# Patient Record
Sex: FEMALE | Race: WHITE | Employment: FULL TIME | URBAN - METROPOLITAN AREA
[De-identification: names, ages, dates, MRNs, and addresses within clinical notes are randomized per-mention and may not be internally consistent; named-entity substitution may affect disease eponyms.]

---

## 2017-03-13 ENCOUNTER — HOSPITAL ENCOUNTER (OUTPATIENT)
Dept: GENERAL RADIOLOGY | Age: 42
Discharge: HOME | End: 2017-03-13
Attending: INTERNAL MEDICINE | Admitting: INTERNAL MEDICINE

## 2017-03-13 DIAGNOSIS — E03.9 HYPOTHYROIDISM, UNSPECIFIED TYPE: ICD-10-CM

## 2017-03-13 LAB
T3 FREE: 2.9 PG/ML (ref 2.3–4.2)
T4 FREE: 0.8 NG/DL (ref 0.9–1.8)
TSH SERPL DL<=0.05 MIU/L-ACNC: 2.2 UIU/ML (ref 0.27–4.2)

## 2017-03-15 ENCOUNTER — OFFICE VISIT (OUTPATIENT)
Dept: ENDOCRINOLOGY | Age: 42
End: 2017-03-15

## 2017-03-15 VITALS
TEMPERATURE: 98.5 F | DIASTOLIC BLOOD PRESSURE: 77 MMHG | HEIGHT: 62 IN | BODY MASS INDEX: 34.23 KG/M2 | OXYGEN SATURATION: 98 % | WEIGHT: 186 LBS | HEART RATE: 71 BPM | SYSTOLIC BLOOD PRESSURE: 129 MMHG

## 2017-03-15 DIAGNOSIS — E03.9 HYPOTHYROIDISM, UNSPECIFIED TYPE: Primary | ICD-10-CM

## 2017-03-15 PROCEDURE — 99213 OFFICE O/P EST LOW 20 MIN: CPT | Performed by: INTERNAL MEDICINE

## 2017-05-01 RX ORDER — LEVOTHYROXINE, LIOTHYRONINE 19; 4.5 UG/1; UG/1
TABLET ORAL
Qty: 30 TABLET | Refills: 4 | Status: SHIPPED | OUTPATIENT
Start: 2017-05-01 | End: 2017-09-20 | Stop reason: SDUPTHER

## 2017-09-18 ENCOUNTER — HOSPITAL ENCOUNTER (OUTPATIENT)
Dept: GENERAL RADIOLOGY | Age: 42
Discharge: HOME | End: 2017-09-18
Attending: INTERNAL MEDICINE | Admitting: INTERNAL MEDICINE

## 2017-09-18 DIAGNOSIS — E03.9 HYPOTHYROIDISM, UNSPECIFIED TYPE: ICD-10-CM

## 2017-09-18 LAB
ANION GAP SERPL CALCULATED.3IONS-SCNC: 14 MMOL/L (ref 3–16)
BUN BLDV-MCNC: 11 MG/DL (ref 7–20)
CALCIUM SERPL-MCNC: 9.4 MG/DL (ref 8.3–10.6)
CHLORIDE BLD-SCNC: 99 MMOL/L (ref 99–110)
CO2: 25 MMOL/L (ref 21–32)
CREAT SERPL-MCNC: 0.8 MG/DL (ref 0.6–1.1)
GFR AFRICAN AMERICAN: >60
GFR NON-AFRICAN AMERICAN: >60
GLUCOSE BLD-MCNC: 83 MG/DL (ref 70–99)
POTASSIUM SERPL-SCNC: 4.2 MMOL/L (ref 3.5–5.1)
SODIUM BLD-SCNC: 138 MMOL/L (ref 136–145)
T3 FREE: 3 PG/ML (ref 2.3–4.2)
T4 FREE: 0.8 NG/DL (ref 0.9–1.8)
TSH SERPL DL<=0.05 MIU/L-ACNC: 2.62 UIU/ML (ref 0.27–4.2)

## 2017-09-20 ENCOUNTER — OFFICE VISIT (OUTPATIENT)
Dept: ENDOCRINOLOGY | Age: 42
End: 2017-09-20

## 2017-09-20 VITALS
OXYGEN SATURATION: 96 % | HEART RATE: 67 BPM | SYSTOLIC BLOOD PRESSURE: 120 MMHG | HEIGHT: 62 IN | WEIGHT: 187.8 LBS | DIASTOLIC BLOOD PRESSURE: 81 MMHG | RESPIRATION RATE: 12 BRPM | BODY MASS INDEX: 34.56 KG/M2

## 2017-09-20 DIAGNOSIS — E03.9 ACQUIRED HYPOTHYROIDISM: Primary | ICD-10-CM

## 2017-09-20 PROCEDURE — 99213 OFFICE O/P EST LOW 20 MIN: CPT | Performed by: INTERNAL MEDICINE

## 2017-09-20 RX ORDER — LEVOTHYROXINE AND LIOTHYRONINE 19; 4.5 UG/1; UG/1
TABLET ORAL
Qty: 30 TABLET | Refills: 5 | Status: SHIPPED | OUTPATIENT
Start: 2017-09-20 | End: 2018-04-04 | Stop reason: SDUPTHER

## 2017-11-15 ENCOUNTER — HOSPITAL ENCOUNTER (OUTPATIENT)
Dept: MAMMOGRAPHY | Age: 42
Discharge: HOME | End: 2017-11-15
Attending: OBSTETRICS & GYNECOLOGY | Admitting: OBSTETRICS & GYNECOLOGY

## 2017-11-15 DIAGNOSIS — N63.0 LUMP OR MASS IN BREAST: ICD-10-CM

## 2018-03-19 ENCOUNTER — HOSPITAL ENCOUNTER (OUTPATIENT)
Dept: GENERAL RADIOLOGY | Age: 43
Discharge: HOME | End: 2018-03-19
Attending: INTERNAL MEDICINE | Admitting: INTERNAL MEDICINE

## 2018-03-19 LAB
ANION GAP SERPL CALCULATED.3IONS-SCNC: 13 MMOL/L (ref 3–16)
BUN BLDV-MCNC: 11 MG/DL (ref 7–20)
CALCIUM SERPL-MCNC: 8.8 MG/DL (ref 8.3–10.6)
CHLORIDE BLD-SCNC: 102 MMOL/L (ref 99–110)
CO2: 26 MMOL/L (ref 21–32)
CREAT SERPL-MCNC: 0.7 MG/DL (ref 0.6–1.1)
GFR AFRICAN AMERICAN: >60
GFR NON-AFRICAN AMERICAN: >60
GLUCOSE BLD-MCNC: 83 MG/DL (ref 70–99)
POTASSIUM SERPL-SCNC: 4.1 MMOL/L (ref 3.5–5.1)
SODIUM BLD-SCNC: 141 MMOL/L (ref 136–145)
T3 FREE: 3.2 PG/ML (ref 2.3–4.2)
T4 FREE: 0.9 NG/DL (ref 0.9–1.8)
TSH SERPL DL<=0.05 MIU/L-ACNC: 2.45 UIU/ML (ref 0.27–4.2)

## 2018-03-21 ENCOUNTER — OFFICE VISIT (OUTPATIENT)
Dept: ENDOCRINOLOGY | Age: 43
End: 2018-03-21

## 2018-03-21 VITALS
BODY MASS INDEX: 35.55 KG/M2 | OXYGEN SATURATION: 99 % | SYSTOLIC BLOOD PRESSURE: 121 MMHG | HEIGHT: 62 IN | HEART RATE: 84 BPM | DIASTOLIC BLOOD PRESSURE: 86 MMHG | WEIGHT: 193.2 LBS

## 2018-03-21 DIAGNOSIS — R73.09 ELEVATED RANDOM BLOOD GLUCOSE LEVEL: ICD-10-CM

## 2018-03-21 DIAGNOSIS — R53.82 CHRONIC FATIGUE: ICD-10-CM

## 2018-03-21 DIAGNOSIS — E03.9 ACQUIRED HYPOTHYROIDISM: Primary | ICD-10-CM

## 2018-03-21 DIAGNOSIS — G47.33 OBSTRUCTIVE SLEEP APNEA SYNDROME: ICD-10-CM

## 2018-03-21 PROBLEM — E66.9 CLASS 2 OBESITY IN ADULT: Status: ACTIVE | Noted: 2018-03-21

## 2018-03-21 PROBLEM — R73.9 ELEVATED RANDOM BLOOD GLUCOSE LEVEL: Status: ACTIVE | Noted: 2018-03-21

## 2018-03-21 PROBLEM — E66.812 CLASS 2 OBESITY IN ADULT: Status: ACTIVE | Noted: 2018-03-21

## 2018-03-21 LAB — HBA1C MFR BLD: 5.4 %

## 2018-03-21 PROCEDURE — G8484 FLU IMMUNIZE NO ADMIN: HCPCS | Performed by: NURSE PRACTITIONER

## 2018-03-21 PROCEDURE — 83036 HEMOGLOBIN GLYCOSYLATED A1C: CPT | Performed by: NURSE PRACTITIONER

## 2018-03-21 PROCEDURE — G8427 DOCREV CUR MEDS BY ELIG CLIN: HCPCS | Performed by: NURSE PRACTITIONER

## 2018-03-21 PROCEDURE — G8417 CALC BMI ABV UP PARAM F/U: HCPCS | Performed by: NURSE PRACTITIONER

## 2018-03-21 PROCEDURE — 99214 OFFICE O/P EST MOD 30 MIN: CPT | Performed by: NURSE PRACTITIONER

## 2018-03-21 PROCEDURE — 1036F TOBACCO NON-USER: CPT | Performed by: NURSE PRACTITIONER

## 2018-03-21 RX ORDER — PHENTERMINE HYDROCHLORIDE 37.5 MG/1
37.5 CAPSULE ORAL EVERY MORNING
Qty: 30 CAPSULE | Refills: 0 | Status: SHIPPED | OUTPATIENT
Start: 2018-03-21 | End: 2018-04-18

## 2018-03-21 ASSESSMENT — ENCOUNTER SYMPTOMS
EYE PAIN: 0
SHORTNESS OF BREATH: 0
DIARRHEA: 0
BACK PAIN: 0
CONSTIPATION: 0
ABDOMINAL PAIN: 0
COLOR CHANGE: 0

## 2018-03-21 ASSESSMENT — PATIENT HEALTH QUESTIONNAIRE - PHQ9
SUM OF ALL RESPONSES TO PHQ9 QUESTIONS 1 & 2: 0
2. FEELING DOWN, DEPRESSED OR HOPELESS: 0
SUM OF ALL RESPONSES TO PHQ QUESTIONS 1-9: 0
1. LITTLE INTEREST OR PLEASURE IN DOING THINGS: 0

## 2018-03-21 NOTE — ASSESSMENT & PLAN NOTE
Will schedule a new sleep study after completing Phentermine protocol and if snoring and fatigue do not subside with the anticipated weight loss. Declines a referral at this time.

## 2018-04-04 RX ORDER — LEVOTHYROXINE, LIOTHYRONINE 19; 4.5 UG/1; UG/1
TABLET ORAL
Qty: 30 TABLET | Refills: 5 | Status: SHIPPED | OUTPATIENT
Start: 2018-04-04 | End: 2018-10-09 | Stop reason: SDUPTHER

## 2018-04-18 ENCOUNTER — OFFICE VISIT (OUTPATIENT)
Dept: ENDOCRINOLOGY | Age: 43
End: 2018-04-18

## 2018-04-18 VITALS
WEIGHT: 191 LBS | DIASTOLIC BLOOD PRESSURE: 80 MMHG | SYSTOLIC BLOOD PRESSURE: 122 MMHG | HEIGHT: 62 IN | BODY MASS INDEX: 35.15 KG/M2

## 2018-04-18 DIAGNOSIS — E03.9 ACQUIRED HYPOTHYROIDISM: Primary | ICD-10-CM

## 2018-04-18 DIAGNOSIS — R53.82 CHRONIC FATIGUE: ICD-10-CM

## 2018-04-18 DIAGNOSIS — G47.33 OBSTRUCTIVE SLEEP APNEA SYNDROME: ICD-10-CM

## 2018-04-18 PROCEDURE — 99214 OFFICE O/P EST MOD 30 MIN: CPT | Performed by: NURSE PRACTITIONER

## 2018-04-18 PROCEDURE — 1036F TOBACCO NON-USER: CPT | Performed by: NURSE PRACTITIONER

## 2018-04-18 PROCEDURE — G8417 CALC BMI ABV UP PARAM F/U: HCPCS | Performed by: NURSE PRACTITIONER

## 2018-04-18 PROCEDURE — G8427 DOCREV CUR MEDS BY ELIG CLIN: HCPCS | Performed by: NURSE PRACTITIONER

## 2018-04-18 RX ORDER — PHENTERMINE HYDROCHLORIDE 37.5 MG/1
37.5 TABLET ORAL
Qty: 30 TABLET | Refills: 0 | Status: SHIPPED | OUTPATIENT
Start: 2018-04-18 | End: 2018-05-18

## 2018-04-18 ASSESSMENT — ENCOUNTER SYMPTOMS
BACK PAIN: 0
COLOR CHANGE: 0
DIARRHEA: 0
EYE PAIN: 0
ABDOMINAL PAIN: 0
SHORTNESS OF BREATH: 0
CONSTIPATION: 0

## 2018-07-02 ENCOUNTER — HOSPITAL ENCOUNTER (OUTPATIENT)
Dept: GENERAL RADIOLOGY | Age: 43
Discharge: HOME | End: 2018-07-02

## 2018-07-02 DIAGNOSIS — R13.10 PROBLEMS WITH SWALLOWING AND MASTICATION: ICD-10-CM

## 2018-10-10 RX ORDER — LEVOTHYROXINE, LIOTHYRONINE 19; 4.5 UG/1; UG/1
TABLET ORAL
Qty: 30 TABLET | Refills: 4 | Status: SHIPPED | OUTPATIENT
Start: 2018-10-10 | End: 2019-03-11 | Stop reason: SDUPTHER

## 2019-02-18 ENCOUNTER — OFFICE VISIT (OUTPATIENT)
Dept: ENDOCRINOLOGY | Age: 44
End: 2019-02-18
Payer: COMMERCIAL

## 2019-02-18 VITALS
HEART RATE: 72 BPM | SYSTOLIC BLOOD PRESSURE: 110 MMHG | HEIGHT: 62 IN | DIASTOLIC BLOOD PRESSURE: 72 MMHG | BODY MASS INDEX: 35.88 KG/M2 | WEIGHT: 195 LBS | OXYGEN SATURATION: 99 %

## 2019-02-18 DIAGNOSIS — E66.01 CLASS 2 SEVERE OBESITY DUE TO EXCESS CALORIES WITH SERIOUS COMORBIDITY AND BODY MASS INDEX (BMI) OF 35.0 TO 35.9 IN ADULT (HCC): ICD-10-CM

## 2019-02-18 DIAGNOSIS — E03.9 ACQUIRED HYPOTHYROIDISM: ICD-10-CM

## 2019-02-18 DIAGNOSIS — E03.9 ACQUIRED HYPOTHYROIDISM: Primary | ICD-10-CM

## 2019-02-18 LAB
A/G RATIO: 1.7 (ref 1.1–2.2)
ALBUMIN SERPL-MCNC: 4.4 G/DL (ref 3.4–5)
ALP BLD-CCNC: 78 U/L (ref 40–129)
ALT SERPL-CCNC: 30 U/L (ref 10–40)
ANION GAP SERPL CALCULATED.3IONS-SCNC: 14 MMOL/L (ref 3–16)
AST SERPL-CCNC: 20 U/L (ref 15–37)
BILIRUB SERPL-MCNC: 0.4 MG/DL (ref 0–1)
BUN BLDV-MCNC: 9 MG/DL (ref 7–20)
CALCIUM SERPL-MCNC: 9.6 MG/DL (ref 8.3–10.6)
CHLORIDE BLD-SCNC: 102 MMOL/L (ref 99–110)
CHOLESTEROL, TOTAL: 216 MG/DL (ref 0–199)
CO2: 25 MMOL/L (ref 21–32)
CREAT SERPL-MCNC: 0.7 MG/DL (ref 0.6–1.1)
GFR AFRICAN AMERICAN: >60
GFR NON-AFRICAN AMERICAN: >60
GLOBULIN: 2.6 G/DL
GLUCOSE BLD-MCNC: 85 MG/DL (ref 70–99)
HDLC SERPL-MCNC: 68 MG/DL (ref 40–60)
LDL CHOLESTEROL CALCULATED: 121 MG/DL
POTASSIUM SERPL-SCNC: 4.8 MMOL/L (ref 3.5–5.1)
SODIUM BLD-SCNC: 141 MMOL/L (ref 136–145)
T3 FREE: 3.6 PG/ML (ref 2.3–4.2)
T4 FREE: 0.8 NG/DL (ref 0.9–1.8)
TOTAL PROTEIN: 7 G/DL (ref 6.4–8.2)
TRIGL SERPL-MCNC: 134 MG/DL (ref 0–150)
TSH SERPL DL<=0.05 MIU/L-ACNC: 2.14 UIU/ML (ref 0.27–4.2)
VLDLC SERPL CALC-MCNC: 27 MG/DL

## 2019-02-18 PROCEDURE — 99213 OFFICE O/P EST LOW 20 MIN: CPT | Performed by: INTERNAL MEDICINE

## 2019-02-18 RX ORDER — BUPROPION HYDROCHLORIDE 150 MG/1
150 TABLET ORAL DAILY
COMMUNITY
Start: 2019-02-12

## 2019-02-18 RX ORDER — OMEPRAZOLE 40 MG/1
40 CAPSULE, DELAYED RELEASE ORAL DAILY
COMMUNITY

## 2019-03-11 RX ORDER — LEVOTHYROXINE, LIOTHYRONINE 19; 4.5 UG/1; UG/1
TABLET ORAL
Qty: 30 TABLET | Refills: 3 | Status: SHIPPED | OUTPATIENT
Start: 2019-03-11 | End: 2019-07-15 | Stop reason: SDUPTHER

## 2019-06-19 ENCOUNTER — OFFICE VISIT (OUTPATIENT)
Dept: ENDOCRINOLOGY | Age: 44
End: 2019-06-19
Payer: COMMERCIAL

## 2019-06-19 VITALS
OXYGEN SATURATION: 96 % | WEIGHT: 201 LBS | BODY MASS INDEX: 36.76 KG/M2 | HEART RATE: 70 BPM | SYSTOLIC BLOOD PRESSURE: 122 MMHG | DIASTOLIC BLOOD PRESSURE: 74 MMHG

## 2019-06-19 DIAGNOSIS — E66.01 CLASS 2 SEVERE OBESITY DUE TO EXCESS CALORIES WITH SERIOUS COMORBIDITY AND BODY MASS INDEX (BMI) OF 35.0 TO 35.9 IN ADULT (HCC): ICD-10-CM

## 2019-06-19 DIAGNOSIS — E03.9 ACQUIRED HYPOTHYROIDISM: Primary | ICD-10-CM

## 2019-06-19 PROCEDURE — 1036F TOBACCO NON-USER: CPT | Performed by: INTERNAL MEDICINE

## 2019-06-19 PROCEDURE — G8427 DOCREV CUR MEDS BY ELIG CLIN: HCPCS | Performed by: INTERNAL MEDICINE

## 2019-06-19 PROCEDURE — G8417 CALC BMI ABV UP PARAM F/U: HCPCS | Performed by: INTERNAL MEDICINE

## 2019-06-19 PROCEDURE — 99213 OFFICE O/P EST LOW 20 MIN: CPT | Performed by: INTERNAL MEDICINE

## 2019-06-19 RX ORDER — LORATADINE 10 MG/1
10 CAPSULE, LIQUID FILLED ORAL DAILY
COMMUNITY
End: 2021-09-10

## 2019-06-19 NOTE — PROGRESS NOTES
Endocrinology  Ha Alarcon M.D. Phone: [phone]   FAX: [phone]       Jorgito Julian   YOB: 1975    Date of Visit:  2019    Allergies   Allergen Reactions    Latex Rash     REDNESS OF HANDS WITH RASH    Sulfa Antibiotics Rash    Other Itching     Kiwi - Itchy throat and mouth  Pinapplie - itchy throat and mouth       Outpatient Medications Marked as Taking for the 19 encounter (Office Visit) with Shi Dove MD   Medication Sig Dispense Refill    loratadine (CLARITIN) 10 MG capsule Take 10 mg by mouth daily      NP THYROID 30 MG tablet TAKE ONE TABLET BY MOUTH DAILY 30 tablet 3    buPROPion (WELLBUTRIN XL) 150 MG extended release tablet Take 150 mg by mouth daily      omeprazole (PRILOSEC) 40 MG delayed release capsule Take 40 mg by mouth daily           Vitals:    19 1536   BP: 122/74   Site: Right Upper Arm   Position: Sitting   Cuff Size: Medium Adult   Pulse: 70   SpO2: 96%   Weight: 201 lb (91.2 kg)     Body mass index is 36.76 kg/m².      Wt Readings from Last 3 Encounters:   19 201 lb (91.2 kg)   19 195 lb (88.5 kg)   18 191 lb (86.6 kg)     BP Readings from Last 3 Encounters:   19 122/74   19 110/72   18 122/80        Past Medical History:   Diagnosis Date    Acid reflux     Carcinoma in situ of cervix     Endometriosis     Hyperlipidemia     Hypertension     Thyroid disease     hypothyroidism     Past Surgical History:   Procedure Laterality Date    ABDOMEN SURGERY  11    laparoscopic cholecystectomy     SECTION  2007    CHOLECYSTECTOMY      COLONOSCOPY  10/8/12    normal   [facility]ENT[facility]GERY  2003    EXTRACTION OF WISDOM TEETH    HYSTERECTOMY  2011    LEEP      UPPER GASTROINTESTINAL ENDOSCOPY  2011    UPPER GASTROINTESTINAL ENDOSCOPY      bravo     Family History   Problem Relation Age of Onset    Diabetes Mother     High Blood Pressure Mother     High Cholesterol Mother speech change, focal weakness, seizures and loss of consciousness. Endo/Heme/Allergies: Negative for environmental allergies and polydipsia. Does not bruise/bleed easily. Psychiatric/Behavioral: Positive for depression. Negative for suicidal ideas, hallucinations, memory loss and substance abuse. The patient is not nervous/anxious and does not have insomnia. Physical Exam   Constitutional: She is oriented to person, place, and time. She appears well-developed. No distress. Iggy Encarnacion Psychiatric: Her behavior is normal. Thought content normal.      Lab Results   Component Value Date    TSH 2.14 02/18/2019    TSH 2.45 03/19/2018    TSH 2.62 09/18/2017      No visits with results within 1 Month(s) from this visit. Latest known visit with results is:   Orders Only on 02/18/2019   Component Date Value Ref Range Status    TSH 02/18/2019 2.14  0.27 - 4.20 uIU/mL Final    T4 Free 02/18/2019 0.8* 0.9 - 1.8 ng/dL Final    T3, Free 02/18/2019 3.6  2.3 - 4.2 pg/mL Final    Sodium 02/18/2019 141  136 - 145 mmol/L Final    Potassium 02/18/2019 4.8  3.5 - 5.1 mmol/L Final    Chloride 02/18/2019 102  99 - 110 mmol/L Final    CO2 02/18/2019 25  21 - 32 mmol/L Final    Anion Gap 02/18/2019 14  3 - 16 Final    Glucose 02/18/2019 85  70 - 99 mg/dL Final    BUN 02/18/2019 9  7 - 20 mg/dL Final    CREATININE 02/18/2019 0.7  0.6 - 1.1 mg/dL Final    GFR Non- 02/18/2019 >60  >60 Final    Comment: >60 mL/min/1.73m2 EGFR, calc. for ages 25 and older using the  MDRD formula (not corrected for weight), is valid for stable  renal function.  GFR  02/18/2019 >60  >60 Final    Comment: Chronic Kidney Disease: less than 60 ml/min/1.73 sq.m. Kidney Failure: less than 15 ml/min/1.73 sq.m. Results valid for patients 18 years and older.       Calcium 02/18/2019 9.6  8.3 - 10.6 mg/dL Final    Total Protein 02/18/2019 7.0  6.4 - 8.2 g/dL Final    Alb 02/18/2019 4.4  3.4 - 5.0 g/dL Final    Albumin/Globulin Ratio 02/18/2019 1.7  1.1 - 2.2 Final    Total Bilirubin 02/18/2019 0.4  0.0 - 1.0 mg/dL Final    Alkaline Phosphatase 02/18/2019 78  40 - 129 U/L Final    ALT 02/18/2019 30  10 - 40 U/L Final    AST 02/18/2019 20  15 - 37 U/L Final    Globulin 02/18/2019 2.6  g/dL Final    Cholesterol, Total 02/18/2019 216* 0 - 199 mg/dL Final    Triglycerides 02/18/2019 134  0 - 150 mg/dL Final    HDL 02/18/2019 68* 40 - 60 mg/dL Final    LDL Calculated 02/18/2019 121* <100 mg/dL Final    VLDL Cholesterol Calculated 02/18/2019 27  Not Established mg/dL Final       Assessment/Plan    1. Hypothyroidism    This 41  yrs old female has hypothyroidism. TSH and Free T4 were normal in 10/14 but she was having too many symptoms and was switched to T3/T4 combination. Currently on NP thyroid 30 mg daily. Will repeat labs      2. Obesity. Discussed life style changes. A1c 5.4 %   She does not want to use Adipex due to concern of side effects         Results via my chart.

## 2019-07-15 DIAGNOSIS — E03.9 ACQUIRED HYPOTHYROIDISM: Primary | ICD-10-CM

## 2019-07-15 RX ORDER — LEVOTHYROXINE, LIOTHYRONINE 19; 4.5 UG/1; UG/1
TABLET ORAL
Qty: 30 TABLET | Refills: 2 | Status: SHIPPED | OUTPATIENT
Start: 2019-07-15 | End: 2019-10-15 | Stop reason: SDUPTHER

## 2019-10-15 DIAGNOSIS — E03.9 ACQUIRED HYPOTHYROIDISM: ICD-10-CM

## 2019-10-15 RX ORDER — LEVOTHYROXINE, LIOTHYRONINE 19; 4.5 UG/1; UG/1
TABLET ORAL
Qty: 30 TABLET | Refills: 3 | Status: SHIPPED | OUTPATIENT
Start: 2019-10-15 | End: 2020-02-14

## 2020-02-14 RX ORDER — LEVOTHYROXINE, LIOTHYRONINE 19; 4.5 UG/1; UG/1
TABLET ORAL
Qty: 30 TABLET | Refills: 3 | Status: SHIPPED | OUTPATIENT
Start: 2020-02-14 | End: 2020-06-08

## 2020-03-03 ENCOUNTER — HOSPITAL ENCOUNTER (OUTPATIENT)
Age: 45
Discharge: HOME | End: 2020-03-03
Payer: COMMERCIAL

## 2020-03-03 LAB
T3 FREE: 3.5 PG/ML (ref 2.3–4.2)
T4 FREE: 0.9 NG/DL (ref 0.9–1.8)
TSH SERPL DL<=0.05 MIU/L-ACNC: 3.22 UIU/ML (ref 0.27–4.2)

## 2020-03-03 PROCEDURE — 84481 FREE ASSAY (FT-3): CPT

## 2020-03-03 PROCEDURE — 36415 COLL VENOUS BLD VENIPUNCTURE: CPT

## 2020-03-03 PROCEDURE — 84443 ASSAY THYROID STIM HORMONE: CPT

## 2020-03-03 PROCEDURE — 84439 ASSAY OF FREE THYROXINE: CPT

## 2020-03-04 ENCOUNTER — OFFICE VISIT (OUTPATIENT)
Dept: ENDOCRINOLOGY | Age: 45
End: 2020-03-04
Payer: COMMERCIAL

## 2020-03-04 VITALS
OXYGEN SATURATION: 98 % | SYSTOLIC BLOOD PRESSURE: 152 MMHG | HEIGHT: 62 IN | WEIGHT: 203 LBS | DIASTOLIC BLOOD PRESSURE: 94 MMHG | HEART RATE: 65 BPM | BODY MASS INDEX: 37.36 KG/M2

## 2020-03-04 PROCEDURE — 99213 OFFICE O/P EST LOW 20 MIN: CPT | Performed by: INTERNAL MEDICINE

## 2020-03-04 PROCEDURE — G8484 FLU IMMUNIZE NO ADMIN: HCPCS | Performed by: INTERNAL MEDICINE

## 2020-03-04 PROCEDURE — G8427 DOCREV CUR MEDS BY ELIG CLIN: HCPCS | Performed by: INTERNAL MEDICINE

## 2020-03-04 PROCEDURE — G8417 CALC BMI ABV UP PARAM F/U: HCPCS | Performed by: INTERNAL MEDICINE

## 2020-03-04 PROCEDURE — 1036F TOBACCO NON-USER: CPT | Performed by: INTERNAL MEDICINE

## 2020-03-04 NOTE — PROGRESS NOTES
Endocrinology  Herman Lai M.D. Phone: [phone]   FAX: [phone]       Makeda Dawson   YOB: 1975    Date of Visit:  3/4/2020    Allergies   Allergen Reactions    Latex Rash     REDNESS OF HANDS WITH RASH    Sulfa Antibiotics Rash    Other Itching     Kiwi - Itchy throat and mouth  Pinapplie - itchy throat and mouth       Outpatient Medications Marked as Taking for the 3/4/20 encounter (Office Visit) with Sully Carter MD   Medication Sig Dispense Refill    NP THYROID 30 MG tablet TAKE ONE TABLET BY MOUTH DAILY 30 tablet 3    loratadine (CLARITIN) 10 MG capsule Take 10 mg by mouth daily      buPROPion (WELLBUTRIN XL) 150 MG extended release tablet Take 150 mg by mouth daily      omeprazole (PRILOSEC) 40 MG delayed release capsule Take 40 mg by mouth daily           Vitals:    20 1439 20 1441   BP: (!) 141/91 (!) 152/94   Site: Right Upper Arm Left Upper Arm   Position: Sitting Sitting   Cuff Size: Large Adult Large Adult   Pulse: 65    SpO2: 98%    Weight: 203 lb (92.1 kg)    Height: 5' 2\" (1.575 m)      Body mass index is 37.13 kg/m².      Wt Readings from Last 3 Encounters:   20 203 lb (92.1 kg)   19 201 lb (91.2 kg)   19 195 lb (88.5 kg)     BP Readings from Last 3 Encounters:   20 (!) 152/94   19 122/74   19 110/72        Past Medical History:   Diagnosis Date    Acid reflux     Carcinoma in situ of cervix     Endometriosis     Hyperlipidemia     Hypertension     Thyroid disease     hypothyroidism     Past Surgical History:   Procedure Laterality Date    ABDOMEN SURGERY  11    laparoscopic cholecystectomy     SECTION  2007    CHOLECYSTECTOMY      COLONOSCOPY  10/8/12    normal    DENTAL SURGERY  2003    EXTRACTION OF WISDOM TEETH    HYSTERECTOMY  2011    LEEP      UPPER GASTROINTESTINAL ENDOSCOPY  2011    UPPER GASTROINTESTINAL ENDOSCOPY      bravo     Family History   Problem Relation Age of Onset    Diabetes Mother     High Blood Pressure Mother     High Cholesterol Mother     High Blood Pressure Father     Kidney Disease Sister      Social History     Tobacco Use   Smoking Status Former Smoker    Packs/day: 0.25    Years: 2.00    Pack years: 0.50    Types: Cigarettes    Last attempt to quit: 1998    Years since quittin.1   Smokeless Tobacco Never Used      Social History     Substance and Sexual Activity   Alcohol Use Yes    Comment: 2 drinks per year       HPI      Tatiana Hernandez is a 45 y.o. female who is here for a follow-up of thyroid disease. Self-Referred. She has a PMH of hypothyroidism, hyperlipidemia, GERD, hypertension. Was last seen by me in     Saw Marvel Melendez for thyroid disease and weight management in 2018. Diagnosed with Hypothyroidism in . Current thyroid medication:NP thyroid 30 mg daily since 10/14. She was on Levothyroxine  50  mcg daily in the past.     Takes it first thing in the morning on empty stomach. FH of hypothyroidism:  Hypothyroidism. FH of thyroid cancer: no  Radiation exposure: no      She has been experiencing dizziness and lightheadedness. Has been checking BS during her episodes of dizziness. BS . Has chronic nausea and abdominal pain. No steroid exposure. No long term opioid use. She has gained weight      Review of Systems   Constitutional: Positive for malaise/fatigue. Negative for fever, chills, weight loss and diaphoresis. Eyes: Negative for blurred vision, double vision and photophobia. Respiratory: Negative for cough and hemoptysis. Cardiovascular: Negative for chest pain, palpitations and orthopnea. Genitourinary: Negative for dysuria, urgency, frequency, hematuria and flank pain. Musculoskeletal: Positive for myalgias. Negative for back pain, joint pain, falls and neck pain. Skin: Negative for itching and rash. Neurological: Positive for headaches.  Negative

## 2020-06-08 ENCOUNTER — TELEPHONE (OUTPATIENT)
Dept: ENDOCRINOLOGY | Age: 45
End: 2020-06-08

## 2020-06-08 RX ORDER — THYROID 30 MG/1
30 TABLET ORAL DAILY
Qty: 30 TABLET | Refills: 3 | Status: SHIPPED | OUTPATIENT
Start: 2020-06-08 | End: 2020-10-09

## 2020-10-09 RX ORDER — THYROID 30 MG/1
TABLET ORAL
Qty: 30 TABLET | Refills: 2 | Status: SHIPPED | OUTPATIENT
Start: 2020-10-09 | End: 2021-01-13 | Stop reason: SDUPTHER

## 2021-01-13 DIAGNOSIS — E03.9 ACQUIRED HYPOTHYROIDISM: ICD-10-CM

## 2021-01-13 RX ORDER — THYROID 30 MG/1
TABLET ORAL
Qty: 30 TABLET | Refills: 2 | Status: SHIPPED | OUTPATIENT
Start: 2021-01-13 | End: 2021-04-13

## 2021-01-13 NOTE — TELEPHONE ENCOUNTER
Medication:   Requested Prescriptions     Pending Prescriptions Disp Refills    KULWANT THYROID 30 MG tablet 30 tablet 2     Sig: TAKE ONE TABLET BY MOUTH DAILY         Last appt: 03/04/2020   Next appt: 03/12/2021    Last Thyroid:   Lab Results   Component Value Date    TSH 3.22 03/03/2020    FT3 3.5 03/03/2020    T4FREE 0.9 03/03/2020

## 2021-03-12 ENCOUNTER — OFFICE VISIT (OUTPATIENT)
Dept: ENDOCRINOLOGY | Age: 46
End: 2021-03-12
Payer: COMMERCIAL

## 2021-03-12 ENCOUNTER — HOSPITAL ENCOUNTER (OUTPATIENT)
Age: 46
Discharge: HOME | End: 2021-03-12
Payer: COMMERCIAL

## 2021-03-12 VITALS
HEART RATE: 68 BPM | HEIGHT: 62 IN | WEIGHT: 210 LBS | OXYGEN SATURATION: 98 % | BODY MASS INDEX: 38.64 KG/M2 | SYSTOLIC BLOOD PRESSURE: 126 MMHG | DIASTOLIC BLOOD PRESSURE: 78 MMHG

## 2021-03-12 DIAGNOSIS — E66.01 CLASS 2 SEVERE OBESITY WITH SERIOUS COMORBIDITY IN ADULT, UNSPECIFIED BMI, UNSPECIFIED OBESITY TYPE (HCC): ICD-10-CM

## 2021-03-12 DIAGNOSIS — E03.9 ACQUIRED HYPOTHYROIDISM: ICD-10-CM

## 2021-03-12 DIAGNOSIS — E03.9 ACQUIRED HYPOTHYROIDISM: Primary | ICD-10-CM

## 2021-03-12 LAB
T3 FREE: 3.6 PG/ML (ref 2.3–4.2)
T4 FREE: 1 NG/DL (ref 0.9–1.8)
TSH SERPL DL<=0.05 MIU/L-ACNC: 1.64 UIU/ML (ref 0.27–4.2)

## 2021-03-12 PROCEDURE — 36415 COLL VENOUS BLD VENIPUNCTURE: CPT

## 2021-03-12 PROCEDURE — 1036F TOBACCO NON-USER: CPT | Performed by: NURSE PRACTITIONER

## 2021-03-12 PROCEDURE — 84439 ASSAY OF FREE THYROXINE: CPT

## 2021-03-12 PROCEDURE — G8484 FLU IMMUNIZE NO ADMIN: HCPCS | Performed by: NURSE PRACTITIONER

## 2021-03-12 PROCEDURE — G8417 CALC BMI ABV UP PARAM F/U: HCPCS | Performed by: NURSE PRACTITIONER

## 2021-03-12 PROCEDURE — 83036 HEMOGLOBIN GLYCOSYLATED A1C: CPT

## 2021-03-12 PROCEDURE — 84481 FREE ASSAY (FT-3): CPT

## 2021-03-12 PROCEDURE — 84443 ASSAY THYROID STIM HORMONE: CPT

## 2021-03-12 PROCEDURE — G8427 DOCREV CUR MEDS BY ELIG CLIN: HCPCS | Performed by: NURSE PRACTITIONER

## 2021-03-12 PROCEDURE — 99213 OFFICE O/P EST LOW 20 MIN: CPT | Performed by: NURSE PRACTITIONER

## 2021-03-12 ASSESSMENT — ENCOUNTER SYMPTOMS
SHORTNESS OF BREATH: 0
EYE PAIN: 0
NAUSEA: 0
CONSTIPATION: 0
COLOR CHANGE: 0
DIARRHEA: 0

## 2021-03-12 NOTE — ASSESSMENT & PLAN NOTE
Was borderline prediabetic at last visit  Will recheck  Has gained weight in the interim; high carb diet

## 2021-03-12 NOTE — PROGRESS NOTES
Endocrinology  Ida Watkins CNP, [address]  [place], [address]  Phone [phone]  Fax [phone]    Bonny Johnson is a 39 y.o. female who presents for evaluation of  hypothyroidism. Referring Provider: STEPHANIE Pantoja CNP     Last A1C:   Lab Results   Component Value Date    LABA1C 5.4 2018     Last BP Readings:   BP Readings from Last 3 Encounters:   21 126/78   20 (!) 152/94   19 122/74     Last LDL:   Lab Results   Component Value Date    LDLCALC 121 (H) 2019     Aspirin Use: no    Tobacco/Alcohol History:   Tobacco Use    Smoking status: Former Smoker     Packs/day: 0.25     Years: 2.00     Pack years: 0.50     Types: Cigarettes     Quit date: 1998     Years since quittin.1    Smokeless tobacco: Never Used   Substance and Sexual Activity    Alcohol use: Yes     Comment: 2 drinks per year    Drug use: No   She has a PMH of hypothyroidism, hyperlipidemia, GERD, hypertension.      Was last seen by me in      Saw Ida Watkins for thyroid disease and weight management in 2018.      Diagnosed with Hypothyroidism in .     Current thyroid medication:NP thyroid 30 mg daily since 10/14.     She was on Levothyroxine  50  mcg daily in the past.      Takes it first thing in the morning on empty stomach.           FH of hypothyroidism:  Hypothyroidism.     FH of thyroid cancer: no  Radiation exposure: no        She has been experiencing dizziness and lightheadedness. Has been checking BS during her episodes of dizziness. BS .     Has chronic nausea and abdominal pain. No steroid exposure. No long term opioid use.      She has gained weight     Thyroid:   Bonny Johnson has a h/o hypothyroidism for > 5 years. Patient has been on varying thyroid medications and is currently on [place] 30 mcg. Confirms that it is taken in early am on an empty stomach.    Clarified that nothing to eat for at least 30 minutes after and no iron or calcium for at.least 3-4 hours after. Current symptoms include fatigue, weight gain, change in skin,  nails, or hair. Patient denies denies heat/cold intolerance, bowel changes or CVS symptoms. Obstructive symptoms  - Change in voice no  - Trouble swallowing no    Predisposing factors for thyroid disease  Exposure to radiation (neck) no  Exposure to iodine no  FH of thyroid disease no  FH of thyroid or other cancers no     Lab Results   Component Value Date    TSH 3.22 03/03/2020    TSH 2.14 02/18/2019    TSH 2.45 03/19/2018    FT3 3.5 03/03/2020    FT3 3.6 02/18/2019    FT3 3.2 03/19/2018    T4FREE 0.9 03/03/2020    T4FREE 0.8 02/18/2019    T4FREE 0.9 03/19/2018     Past Medical History:   Diagnosis Date    Acid reflux     Carcinoma in situ of cervix     Endometriosis     Hyperlipidemia     Hypertension     Thyroid disease     hypothyroidism     Family History   Problem Relation Age of Onset    Diabetes Mother     High Blood Pressure Mother     High Cholesterol Mother     High Blood Pressure Father     Kidney Disease Sister      Current Outpatient Medications   Medication Sig Dispense Refill    ARMOUR THYROID 30 MG tablet TAKE ONE TABLET BY MOUTH DAILY 30 tablet 2    loratadine (CLARITIN) 10 MG capsule Take 10 mg by mouth daily      buPROPion (WELLBUTRIN XL) 150 MG extended release tablet Take 150 mg by mouth daily      omeprazole (PRILOSEC) 40 MG delayed release capsule Take 40 mg by mouth daily       No current facility-administered medications for this visit. Review of Systems   Constitutional: Negative for activity change, appetite change, diaphoresis, fever and unexpected weight change. HENT: Negative for dental problem. Eyes: Negative for pain and visual disturbance. Respiratory: Negative for shortness of breath. Cardiovascular: Negative for chest pain, palpitations and leg swelling. Gastrointestinal: Negative for constipation, diarrhea and nausea. diet         Relevant Orders    Hemoglobin A1C      Greater than 20 minutes spent directly counseling patient about topics listed above (such as lifestyle modifications, preventative screenings and/or disease related processes). Return in about 3 months (around 6/12/2021).

## 2021-03-13 LAB
ESTIMATED AVERAGE GLUCOSE: 105.4 MG/DL
HBA1C MFR BLD: 5.3 %

## 2021-04-13 DIAGNOSIS — E03.9 ACQUIRED HYPOTHYROIDISM: ICD-10-CM

## 2021-04-13 RX ORDER — THYROID 30 MG/1
TABLET ORAL
Qty: 30 TABLET | Refills: 5 | Status: SHIPPED | OUTPATIENT
Start: 2021-04-13 | End: 2021-10-28

## 2021-04-13 NOTE — TELEPHONE ENCOUNTER
Medication:   Requested Prescriptions     Pending Prescriptions Disp Refills    KULWANT THYROID 30 MG tablet [Pharmacy Med Name: KULWANT THYROID 30 MG TABLET] 30 tablet 1     Sig: TAKE ONE TABLET BY MOUTH DAILY         Last appt: 03/12/2021  Next appt: Visit date not found    Last Thyroid:   Lab Results   Component Value Date    TSH 1.64 03/12/2021    FT3 3.6 03/12/2021    T4FREE 1.0 03/12/2021

## 2021-09-09 ENCOUNTER — HOSPITAL ENCOUNTER (OUTPATIENT)
Age: 46
Discharge: HOME | End: 2021-09-09
Payer: COMMERCIAL

## 2021-09-09 ENCOUNTER — TELEPHONE (OUTPATIENT)
Dept: SURGERY | Age: 46
End: 2021-09-09

## 2021-09-09 DIAGNOSIS — Z01.818 PRE-OP TESTING: Primary | ICD-10-CM

## 2021-09-09 DIAGNOSIS — Z01.818 PRE-OP TESTING: ICD-10-CM

## 2021-09-09 PROCEDURE — U0003 INFECTIOUS AGENT DETECTION BY NUCLEIC ACID (DNA OR RNA); SEVERE ACUTE RESPIRATORY SYNDROME CORONAVIRUS 2 (SARS-COV-2) (CORONAVIRUS DISEASE [COVID-19]), AMPLIFIED PROBE TECHNIQUE, MAKING USE OF HIGH THROUGHPUT TECHNOLOGIES AS DESCRIBED BY CMS-2020-01-R: HCPCS

## 2021-09-09 PROCEDURE — U0005 INFEC AGEN DETEC AMPLI PROBE: HCPCS

## 2021-09-09 NOTE — TELEPHONE ENCOUNTER
We received order for port from [facility]. I called patient and she is scheduled for Monday 9/13/2021. She is on Xarelto. How many days should she hold this preop?

## 2021-09-10 ENCOUNTER — ANESTHESIA EVENT (OUTPATIENT)
Dept: OPERATING ROOM | Age: 46
End: 2021-09-10
Payer: COMMERCIAL

## 2021-09-10 LAB — SARS-COV-2: NOT DETECTED

## 2021-09-10 RX ORDER — CETIRIZINE HYDROCHLORIDE 10 MG/1
10 TABLET ORAL DAILY
COMMUNITY

## 2021-09-10 NOTE — PROGRESS NOTES
1.  Do not eat or drink anything after 12 midnight prior to surgery. This includes no water, chewing gum or mints. 2.  Take the following pills with a small sip of water on the morning of surgery   3. Aspirin, Ibuprofen, Advil, Naproxen, Vitamin E and other Anti-inflammatory products should be stopped for 5 days before surgery or as directed by your physician. 4.  Check with your doctor regarding stopping Plavix, Coumadin, Lovenox, Fragmin or other blood thinners. 5.  Do not smoke and do not drink alcoholic beverages 24 hours prior to surgery. This includes NA Beer. 6.  You may brush your teeth and gargle the morning of surgery. DO NOT SWALLOW WATER.  7.  You MUST make arrangements for a responsible adult to take you home after your surgery. You will not be allowed to leave alone or drive yourself home. It is strongly suggested someone stay with you the first 24 hours. Your surgery will be cancelled if you do not have a ride home. 8.  A parent/legal guardian must accompany a child scheduled for surgery and plan to stay at the hospital until the child is discharged. Please do not bring other children with you. 9.  Please wear simple, loose fitting clothing to the hospital.  Licha Franks not bring valuables ( money, credit cards, checkbooks, etc.)  Do not wear any makeup (including no eye makeup) or nail polish on your fingers or toes. 10.  Do not wear any jewelry or piercing on the day of surgery. All body piercing jewelry must be removed. 11.  If you have dentures, they will be removed before going to the OR; we will provide you a container. If you wear contact lenses or glasses, they will be removed; please bring a case for them. 12.  Please see your family doctor/pediatrician for a history & physical and/or concerning medications. Bring any test results/reports from your physician's office the day of surgery. 15.  Remember to bring Blood Bank Bracelet to the hospital on the day of surgery.   14.  If you have a Living Will and Durable Power of  for Healthcare, please bring in a copy. 13.  Notify your Surgeon if you develop any illness between now and surgery time; cough, cold, fever, sore throat, nausea, vomiting, etc.  Please notify your surgeon if you experience dizziness, shortness of breath or blurred vision between now and the time of your surgery. 16.  DO NOT shave your operative site 96 hours (4 days) prior to surgery. For face and neck surgery, men may use an electric razor 48 hours (2 days) prior to surgery. 17. Shower the night before surgery and the morning of surgery with  an antibacterial soap   or  Chlorhexidine gluconate (for total joint replacement). To provide excellent care, visitors will be limited to two in a room at any given time. Please no children under the age of 15 in the surgical department.

## 2021-09-13 ENCOUNTER — ANESTHESIA (OUTPATIENT)
Dept: OPERATING ROOM | Age: 46
End: 2021-09-13
Payer: COMMERCIAL

## 2021-09-13 ENCOUNTER — HOSPITAL ENCOUNTER (OUTPATIENT)
Age: 46
Setting detail: OUTPATIENT SURGERY
Discharge: HOME | End: 2021-09-13
Attending: SURGERY | Admitting: SURGERY
Payer: COMMERCIAL

## 2021-09-13 ENCOUNTER — APPOINTMENT (OUTPATIENT)
Dept: GENERAL RADIOLOGY | Age: 46
End: 2021-09-13
Attending: SURGERY
Payer: COMMERCIAL

## 2021-09-13 VITALS
SYSTOLIC BLOOD PRESSURE: 133 MMHG | OXYGEN SATURATION: 96 % | BODY MASS INDEX: 35.88 KG/M2 | TEMPERATURE: 97.5 F | HEART RATE: 54 BPM | DIASTOLIC BLOOD PRESSURE: 83 MMHG | HEIGHT: 62 IN | WEIGHT: 195 LBS | RESPIRATION RATE: 16 BRPM

## 2021-09-13 VITALS — OXYGEN SATURATION: 95 % | SYSTOLIC BLOOD PRESSURE: 116 MMHG | DIASTOLIC BLOOD PRESSURE: 69 MMHG

## 2021-09-13 PROCEDURE — 3700000000 HC ANESTHESIA ATTENDED CARE: Performed by: SURGERY

## 2021-09-13 PROCEDURE — 2580000003 HC RX 258: Performed by: SURGERY

## 2021-09-13 PROCEDURE — 77001 FLUOROGUIDE FOR VEIN DEVICE: CPT

## 2021-09-13 PROCEDURE — 36561 INSERT TUNNELED CV CATH: CPT | Performed by: SURGERY

## 2021-09-13 PROCEDURE — 6360000002 HC RX W HCPCS: Performed by: NURSE ANESTHETIST, CERTIFIED REGISTERED

## 2021-09-13 PROCEDURE — 2709999900 HC NON-CHARGEABLE SUPPLY: Performed by: SURGERY

## 2021-09-13 PROCEDURE — 77001 FLUOROGUIDE FOR VEIN DEVICE: CPT | Performed by: SURGERY

## 2021-09-13 PROCEDURE — 3600000012 HC SURGERY LEVEL 2 ADDTL 15MIN: Performed by: SURGERY

## 2021-09-13 PROCEDURE — 7100000010 HC PHASE II RECOVERY - FIRST 15 MIN: Performed by: SURGERY

## 2021-09-13 PROCEDURE — 3700000001 HC ADD 15 MINUTES (ANESTHESIA): Performed by: SURGERY

## 2021-09-13 PROCEDURE — 6360000002 HC RX W HCPCS: Performed by: SURGERY

## 2021-09-13 PROCEDURE — 71045 X-RAY EXAM CHEST 1 VIEW: CPT

## 2021-09-13 PROCEDURE — 3600000002 HC SURGERY LEVEL 2 BASE: Performed by: SURGERY

## 2021-09-13 PROCEDURE — C1788 PORT, INDWELLING, IMP: HCPCS | Performed by: SURGERY

## 2021-09-13 PROCEDURE — 2500000003 HC RX 250 WO HCPCS: Performed by: SURGERY

## 2021-09-13 PROCEDURE — 7100000011 HC PHASE II RECOVERY - ADDTL 15 MIN: Performed by: SURGERY

## 2021-09-13 PROCEDURE — 2500000003 HC RX 250 WO HCPCS: Performed by: NURSE ANESTHETIST, CERTIFIED REGISTERED

## 2021-09-13 PROCEDURE — 2580000003 HC RX 258: Performed by: ANESTHESIOLOGY

## 2021-09-13 DEVICE — PORT INFUS OD2.7MM ID1.5MM INTRO 8FR TI POLYUR CATH DETACH CT80STPD] ANGIODYNAMICS INC]: Type: IMPLANTABLE DEVICE | Site: CHEST | Status: FUNCTIONAL

## 2021-09-13 RX ORDER — KETOROLAC TROMETHAMINE 30 MG/ML
INJECTION, SOLUTION INTRAMUSCULAR; INTRAVENOUS PRN
Status: DISCONTINUED | OUTPATIENT
Start: 2021-09-13 | End: 2021-09-13 | Stop reason: SDUPTHER

## 2021-09-13 RX ORDER — FENTANYL CITRATE 50 UG/ML
INJECTION, SOLUTION INTRAMUSCULAR; INTRAVENOUS PRN
Status: DISCONTINUED | OUTPATIENT
Start: 2021-09-13 | End: 2021-09-13 | Stop reason: SDUPTHER

## 2021-09-13 RX ORDER — LIDOCAINE HYDROCHLORIDE 20 MG/ML
INJECTION, SOLUTION INFILTRATION; PERINEURAL PRN
Status: DISCONTINUED | OUTPATIENT
Start: 2021-09-13 | End: 2021-09-13 | Stop reason: SDUPTHER

## 2021-09-13 RX ORDER — HEPARIN SODIUM (PORCINE) LOCK FLUSH IV SOLN 100 UNIT/ML 100 UNIT/ML
SOLUTION INTRAVENOUS PRN
Status: DISCONTINUED | OUTPATIENT
Start: 2021-09-13 | End: 2021-09-13 | Stop reason: ALTCHOICE

## 2021-09-13 RX ORDER — SODIUM CHLORIDE, SODIUM LACTATE, POTASSIUM CHLORIDE, CALCIUM CHLORIDE 600; 310; 30; 20 MG/100ML; MG/100ML; MG/100ML; MG/100ML
INJECTION, SOLUTION INTRAVENOUS CONTINUOUS
Status: DISCONTINUED | OUTPATIENT
Start: 2021-09-13 | End: 2021-09-13 | Stop reason: HOSPADM

## 2021-09-13 RX ORDER — MIDAZOLAM HYDROCHLORIDE 1 MG/ML
INJECTION INTRAMUSCULAR; INTRAVENOUS PRN
Status: DISCONTINUED | OUTPATIENT
Start: 2021-09-13 | End: 2021-09-13 | Stop reason: SDUPTHER

## 2021-09-13 RX ORDER — PROPOFOL 10 MG/ML
INJECTION, EMULSION INTRAVENOUS PRN
Status: DISCONTINUED | OUTPATIENT
Start: 2021-09-13 | End: 2021-09-13 | Stop reason: SDUPTHER

## 2021-09-13 RX ADMIN — KETOROLAC TROMETHAMINE 30 MG: 30 INJECTION, SOLUTION INTRAMUSCULAR at 09:25

## 2021-09-13 RX ADMIN — MIDAZOLAM 2 MG: 1 INJECTION INTRAMUSCULAR; INTRAVENOUS at 09:02

## 2021-09-13 RX ADMIN — FENTANYL CITRATE 25 MCG: 50 INJECTION INTRAMUSCULAR; INTRAVENOUS at 09:12

## 2021-09-13 RX ADMIN — FENTANYL CITRATE 25 MCG: 50 INJECTION INTRAMUSCULAR; INTRAVENOUS at 09:11

## 2021-09-13 RX ADMIN — Medication 2000 MG: at 08:53

## 2021-09-13 RX ADMIN — SODIUM CHLORIDE, POTASSIUM CHLORIDE, SODIUM LACTATE AND CALCIUM CHLORIDE: 600; 310; 30; 20 INJECTION, SOLUTION INTRAVENOUS at 07:34

## 2021-09-13 RX ADMIN — LIDOCAINE HYDROCHLORIDE 60 MG: 20 INJECTION, SOLUTION INFILTRATION; PERINEURAL at 09:07

## 2021-09-13 RX ADMIN — FENTANYL CITRATE 25 MCG: 50 INJECTION INTRAMUSCULAR; INTRAVENOUS at 09:25

## 2021-09-13 RX ADMIN — PROPOFOL 120 MG: 10 INJECTION, EMULSION INTRAVENOUS at 09:11

## 2021-09-13 RX ADMIN — PROPOFOL 80 MG: 10 INJECTION, EMULSION INTRAVENOUS at 09:07

## 2021-09-13 ASSESSMENT — PULMONARY FUNCTION TESTS
PIF_VALUE: 1
PIF_VALUE: 0
PIF_VALUE: 1
PIF_VALUE: 0
PIF_VALUE: 1
PIF_VALUE: 1
PIF_VALUE: 0
PIF_VALUE: 0
PIF_VALUE: 1
PIF_VALUE: 0
PIF_VALUE: 1
PIF_VALUE: 0
PIF_VALUE: 1
PIF_VALUE: 0
PIF_VALUE: 1
PIF_VALUE: 0
PIF_VALUE: 0
PIF_VALUE: 1

## 2021-09-13 ASSESSMENT — PAIN SCALES - GENERAL: PAINLEVEL_OUTOF10: 0

## 2021-09-13 NOTE — ANESTHESIA POSTPROCEDURE EVALUATION
Department of Anesthesiology  Postprocedure Note    Patient: Amrik Hahn  MRN: [id number]  YOB: 1975  Date of evaluation: 9/13/2021  Time:  1:26 PM     Procedure Summary     Date: 09/13/21 Room / Location: [facility]    Anesthesia Start: 0902 Anesthesia Stop: 0935    Procedure: PORT INSERTION (N/A ) Diagnosis: (NEOPLASM OF OVARY)    Surgeons: Sharita Zaidi MD Responsible Provider: Paulina Salas MD    Anesthesia Type: MAC ASA Status: 3          Anesthesia Type: MAC    Phong Phase I: Phong Score: 10    Phong Phase II: Phong Score: 10    Last vitals: Reviewed and per EMR flowsheets.        Anesthesia Post Evaluation    Comments: Postoperative Anesthesia Note    Name:    Amrik Hahn  MRN:      [id number]    Patient Vitals in the past 12 hrs:  09/13/21 1030, BP:133/83, Pulse:54, SpO2:96 %  09/13/21 1015, BP:128/79, Pulse:51, SpO2:96 %  09/13/21 1000, BP:114/76, Pulse:60, Resp:16, SpO2:97 %  09/13/21 0950, BP:127/83, Pulse:57, Resp:16, SpO2:98 %  09/13/21 0945, BP:123/76, Pulse:65, Resp:16, SpO2:96 %  09/13/21 0940, BP:124/86, Pulse:70, Resp:16, SpO2:96 %  09/13/21 0937, BP:131/79, Temp:97.5 °F (36.4 °C), Temp src:Infrared, Pulse:77, Resp:16, SpO2:95 %  09/13/21 0715, BP:(!) 163/96, Temp:97.5 °F (36.4 °C), Temp src:Infrared, Pulse:63, Resp:20, SpO2:99 %     LABS:    CBC  Lab Results       Component                Value               Date/Time                  WBC                      9.5                 01/11/2018 12:49 AM        HGB                      13.6                01/11/2018 12:49 AM        HCT                      39.0                01/11/2018 12:49 AM        PLT                      298                 01/11/2018 12:49 AM   RENAL  Lab Results       Component                Value               Date/Time                  NA                       141                 02/18/2019 10:29 AM        K                        4.8 02/18/2019 10:29 AM        CL                       102                 02/18/2019 10:29 AM        CO2                      25                  02/18/2019 10:29 AM        BUN                      9                   02/18/2019 10:29 AM        CREATININE               0.7                 02/18/2019 10:29 AM        GLUCOSE                  85                  02/18/2019 10:29 AM   COAGS  Lab Results       Component                Value               Date/Time                  PROTIME                  11.9                02/12/2014 10:25 AM        INR                      1.06                02/12/2014 10:25 AM        APTT                     33.8                01/02/2013 06:07 PM     Intake & Output: In: 550 (P.O.:50; I.V.:500)  Out: 5     Nausea & Vomiting:  No    Level of Consciousness:  Awake    Pain Assessment:  Adequate analgesia    Anesthesia Complications:  No apparent anesthetic complications    SUMMARY      Vital signs stable  OK to discharge from Stage I post anesthesia care.   Care transferred from Anesthesiology department on discharge from perioperative area

## 2021-09-13 NOTE — H&P
[facility] GENERAL SURGERY      The H&P was reviewed, the patient was examined, and no change has occurred in the patient's condition since the H&P was completed. The indications for the procedure were reviewed, and any questions were answered. I updated the progress note from 9/2/2021 from Dr. Suzan Turcios which is the H&P.     Vitals:    09/13/21 0715   BP: (!) 163/96   Pulse: 63   Resp: 20   Temp: 97.5 °F (36.4 °C)   SpO2: 99%

## 2021-09-13 NOTE — BRIEF OP NOTE
Brief Postoperative Note      Patient: Jag De La O  YOB: 1975  MRN: [id number]    Date of Procedure: 9/13/2021    Pre-Op Diagnosis: Ovarian Cancer    Post-Op Diagnosis: Same       Procedure(s):  PORT INSERTION    Surgeon(s):  Kat Cai MD    Assistant:  Surgical Assistant: eNna Trejo    Anesthesia: Monitor Anesthesia Care    Estimated Blood Loss (mL): Minimal    Complications: None    Specimens:   * No specimens in log *    Implants:  Implant Name Type Inv. Item Serial No.  Lot No. LRB No. Used Action   PORT INFUS OD2.7MM ID1. 5MM INTRO 8FR TI POLYUR CATH DETACH [[id number]] [[employer]]  PORT INFUS OD2.7MM ID1. 5MM INTRO 8FR TI POLYUR CATH DETACH [[id number]] [[employer]]  [employer] [id number] 1 Implanted         Drains: * No LDAs found *    Findings: As above    Electronically signed by Taylor Singh MD on 9/13/2021 at 9:48 AM

## 2021-09-13 NOTE — ANESTHESIA PRE PROCEDURE
Department of Anesthesiology  Preprocedure Note       Name:  Angie Pappas   Age:  39 y.o.  :  1975                                          MRN:  [id number]         Date:  2021      Surgeon: Christina Taylor):  Elli Hernandez MD    Procedure: Procedure(s):  PORT INSERTION    Medications prior to admission:   Prior to Admission medications    Medication Sig Start Date End Date Taking? Authorizing Provider   cetirizine (ZYRTEC) 10 MG tablet Take 10 mg by mouth daily   Yes Historical Provider, MD BLUM THYROID 30 MG tablet TAKE ONE TABLET BY MOUTH DAILY 21  Yes STEPHANIE Villegas - CNP   buPROPion (WELLBUTRIN XL) 150 MG extended release tablet Take 150 mg by mouth daily 19  Yes Historical Provider, MD   omeprazole (PRILOSEC) 40 MG delayed release capsule Take 40 mg by mouth daily   Yes Historical Provider, MD       Current medications:    Current Facility-Administered Medications   Medication Dose Route Frequency Provider Last Rate Last Admin    lactated ringers infusion   IntraVENous Continuous Karl Garcia MD 50 mL/hr at 21 0734 New Bag at 21 0734    lidocaine 1 % (PF) injection 0.1 mL  0.1 mL IntraDERmal Once PRN Karl Garcia MD           Allergies:     Allergies   Allergen Reactions    Latex Rash     REDNESS OF HANDS WITH RASH    Sulfa Antibiotics Rash    Other Itching     Kiwi - Itchy throat and mouth  Pinapplie - itchy throat and mouth         Problem List:    Patient Active Problem List   Diagnosis Code    Hypothyroidism E03.9    Fatigue R53.83    Dizziness R42    Class 2 obesity in adult E66.9    Elevated random blood glucose level R73.09    Obstructive sleep apnea syndrome G47.33       Past Medical History:        Diagnosis Date    Acid reflux     Carcinoma in situ of cervix     Endometriosis     Hyperlipidemia     Hypertension     Thyroid disease     hypothyroidism       Past Surgical History:        Procedure Laterality Date    ABDOMEN SURGERY  11    laparoscopic cholecystectomy     SECTION  2007    CHOLECYSTECTOMY      COLONOSCOPY  10/8/12    [facility]  2003    EXTRACTION OF WISDOM TEETH    HYSTERECTOMY  2011    LEEP      UPPER GASTROINTESTINAL ENDOSCOPY  2011    UPPER GASTROINTESTINAL ENDOSCOPY      bravo       Social History:    Social History     Tobacco Use    Smoking status: Former Smoker     Packs/day: 0.25     Years: 2.00     Pack years: 0.50     Types: Cigarettes     Quit date: 1998     Years since quittin.6    Smokeless tobacco: Never Used   Substance Use Topics    Alcohol use: Yes     Comment: 2 drinks per year                                Counseling given: Not Answered      Vital Signs (Current):   Vitals:    09/10/21 1153 21 0715   BP:  (!) 163/96   Pulse:  63   Resp:  20   Temp:  97.5 °F (36.4 °C)   TempSrc:  Infrared   SpO2:  99%   Weight: 195 lb (88.5 kg)    Height: 5' 2\" (1.575 m)                                               BP Readings from Last 3 Encounters:   21 (!) 163/96   21 126/78   20 (!) 152/94       NPO Status: Time of last liquid consumption:                         Time of last solid consumption:                         Date of last liquid consumption: 21                        Date of last solid food consumption: 21    BMI:   Wt Readings from Last 3 Encounters:   09/10/21 195 lb (88.5 kg)   21 210 lb (95.3 kg)   20 203 lb (92.1 kg)     Body mass index is 35.67 kg/m².     CBC:   Lab Results   Component Value Date    WBC 9.5 2018    RBC 4.40 2018    HGB 13.6 2018    HCT 39.0 2018    MCV 88.7 2018    RDW 13.7 2018     2018       CMP:   Lab Results   Component Value Date     2019    K 4.8 2019     2019    CO2 25 2019    BUN 9 2019    CREATININE 0.7 2019    GFRAA >60 2019    GFRAA >60 2013 AGRATIO 1.7 02/18/2019    LABGLOM >60 02/18/2019    GLUCOSE 85 02/18/2019    PROT 7.0 02/18/2019    PROT 7.8 01/02/2013    CALCIUM 9.6 02/18/2019    BILITOT 0.4 02/18/2019    ALKPHOS 78 02/18/2019    AST 20 02/18/2019    ALT 30 02/18/2019       POC Tests: No results for input(s): POCGLU, POCNA, POCK, POCCL, POCBUN, POCHEMO, POCHCT in the last 72 hours. Coags:   Lab Results   Component Value Date    PROTIME 11.9 02/12/2014    INR 1.06 02/12/2014    APTT 33.8 01/02/2013       HCG (If Applicable):   Lab Results   Component Value Date    PREGTESTUR Negative 02/02/2015        ABGs: No results found for: PHART, PO2ART, QKB7IYD, VWL0SNW, BEART, V6ZEMAZG     Type & Screen (If Applicable):  No results found for: LABABO, LABRH    Drug/Infectious Status (If Applicable):  No results found for: HIV, HEPCAB    COVID-19 Screening (If Applicable):   Lab Results   Component Value Date    COVID19 Not Detected 09/09/2021           Anesthesia Evaluation  Patient summary reviewed and Nursing notes reviewed history of anesthetic complications:   Airway: Mallampati: III     Neck ROM: full   Dental:          Pulmonary:   (+) sleep apnea:                             Cardiovascular:    (+) hypertension:,                   Neuro/Psych:               GI/Hepatic/Renal:   (+) GERD:,          ROS comment: obesity. Endo/Other:    (+) hypothyroidism::., malignancy/cancer. Abdominal:             Vascular: Other Findings:           Anesthesia Plan      MAC     ASA 3       Induction: intravenous. Anesthetic plan and risks discussed with patient. Plan discussed with CRNA.                   Rylan Aparicio MD   9/13/2021

## 2021-09-19 NOTE — OP NOTE
[address]                 [address] [address]                                OPERATIVE REPORT    PATIENT NAME: Lora WISE                    :        1975  MED REC NO:   [id number]                          ROOM:  ACCOUNT NO:   [de-identified]                           ADMIT DATE: 2021  PROVIDER:     Morris Sparks MD    DATE OF PROCEDURE:  2021    OPERATION PERFORMED:  1. Insertion of left subclavian Port-A-Cath. 2.  Surgeon's use of fluoroscopy. PREOPERATIVE DIAGNOSIS:  Ovarian cancer. POSTOPERATIVE DIAGNOSIS:  Ovarian cancer. SURGEON:  Morris Sparks MD    ANESTHESIA:  Total intravenous anesthesia. COMPLICATIONS:  None. ESTIMATED BLOOD LOSS:  Less than 50 mL. INDICATIONS FOR OPERATION:  A 61-year-old female being treated for  ovarian cancer. A Port-A-Cath is requested to facilitate the  treatments. The risks and benefits were explained. The patient  understood them, accepted them, and elected to proceed. DESCRIPTION OF OPERATION:  The patient was brought to the operating  room. Total intravenous anesthesia was initiated. She was prepped and  draped in the usual surgical sterile fashion. She was placed in  Trendelenburg position. Local anesthetic was infiltrated in the left  subclavian area. A single venipuncture was used to cannulate the left  subclavian vein. Guidewire passed easily. Its position was confirmed  with fluoroscopy. An incision was made medial and lateral to the exit  site of the wire. A pocket was created on the left chest wall. The  dilator and introducer were passed over the guidewire in a Seldinger  technique under fluoroscopic guidance. The dilator and guidewire were  removed. Catheter was inserted through the sheath. Sheath was peeled  away. Catheter was pulled back to length under fluoroscopic guidance. The catheter was cut and the hub and port were attached.   Port was  secured to the left chest wall with Vicryl suture. There was a good  draw and an easy flush through the system. The course of the catheter  was inspected from start to finish. There was no evident kink or  compromise. 3-0 Vicryl was used to reapproximate subcutaneous tissues. 4-0 Vicryl was used to reapproximate the skin. Benzoin and Steri-Strip  dressing were placed. DISPOSITION:  The patient tolerated the procedure without any acute  complication. Miryam Rock MD    D: 09/19/2021 12:38:56       T: 09/19/2021 12:42:36     [email or web address]  Job#: [id number]     Doc#: [id number]    CC: MD Jonel Munoz MD

## 2021-09-23 ENCOUNTER — APPOINTMENT (OUTPATIENT)
Dept: CT IMAGING | Age: 46
End: 2021-09-23
Payer: COMMERCIAL

## 2021-09-23 ENCOUNTER — HOSPITAL ENCOUNTER (OUTPATIENT)
Dept: VASCULAR LAB | Age: 46
Discharge: HOME | End: 2021-09-23
Payer: COMMERCIAL

## 2021-09-23 ENCOUNTER — HOSPITAL ENCOUNTER (EMERGENCY)
Age: 46
Discharge: HOME | End: 2021-09-23
Attending: STUDENT IN AN ORGANIZED HEALTH CARE EDUCATION/TRAINING PROGRAM
Payer: COMMERCIAL

## 2021-09-23 VITALS
DIASTOLIC BLOOD PRESSURE: 78 MMHG | HEART RATE: 88 BPM | RESPIRATION RATE: 20 BRPM | WEIGHT: 195 LBS | BODY MASS INDEX: 35.88 KG/M2 | SYSTOLIC BLOOD PRESSURE: 150 MMHG | HEIGHT: 62 IN | TEMPERATURE: 98.2 F | OXYGEN SATURATION: 99 %

## 2021-09-23 DIAGNOSIS — I82.622 ARM DVT (DEEP VENOUS THROMBOEMBOLISM), ACUTE, LEFT (HCC): Primary | ICD-10-CM

## 2021-09-23 DIAGNOSIS — R60.0 EDEMA OF RIGHT UPPER EXTREMITY: ICD-10-CM

## 2021-09-23 LAB
A/G RATIO: 1.5 (ref 1.1–2.2)
ALBUMIN SERPL-MCNC: 4.6 G/DL (ref 3.4–5)
ALP BLD-CCNC: 103 U/L (ref 40–129)
ALT SERPL-CCNC: 43 U/L (ref 10–40)
ANION GAP SERPL CALCULATED.3IONS-SCNC: 9 MMOL/L (ref 3–16)
AST SERPL-CCNC: 21 U/L (ref 15–37)
BASOPHILS ABSOLUTE: 0 K/UL (ref 0–0.2)
BASOPHILS RELATIVE PERCENT: 0.3 %
BILIRUB SERPL-MCNC: <0.2 MG/DL (ref 0–1)
BILIRUBIN URINE: NEGATIVE
BLOOD, URINE: NEGATIVE
BUN BLDV-MCNC: 15 MG/DL (ref 7–20)
CALCIUM SERPL-MCNC: 9.5 MG/DL (ref 8.3–10.6)
CHLORIDE BLD-SCNC: 96 MMOL/L (ref 99–110)
CLARITY: CLEAR
CO2: 28 MMOL/L (ref 21–32)
COLOR: YELLOW
CREAT SERPL-MCNC: 0.8 MG/DL (ref 0.6–1.1)
EOSINOPHILS ABSOLUTE: 0.3 K/UL (ref 0–0.6)
EOSINOPHILS RELATIVE PERCENT: 3.3 %
GFR AFRICAN AMERICAN: >60
GFR NON-AFRICAN AMERICAN: >60
GLOBULIN: 3 G/DL
GLUCOSE BLD-MCNC: 111 MG/DL (ref 70–99)
GLUCOSE URINE: NEGATIVE MG/DL
HCT VFR BLD CALC: 39.1 % (ref 36–48)
HEMOGLOBIN: 13 G/DL (ref 12–16)
KETONES, URINE: NEGATIVE MG/DL
LEUKOCYTE ESTERASE, URINE: NEGATIVE
LYMPHOCYTES ABSOLUTE: 3.3 K/UL (ref 1–5.1)
LYMPHOCYTES RELATIVE PERCENT: 38.1 %
MCH RBC QN AUTO: 29.5 PG (ref 26–34)
MCHC RBC AUTO-ENTMCNC: 33.3 G/DL (ref 31–36)
MCV RBC AUTO: 88.7 FL (ref 80–100)
MICROSCOPIC EXAMINATION: NORMAL
MONOCYTES ABSOLUTE: 0.6 K/UL (ref 0–1.3)
MONOCYTES RELATIVE PERCENT: 6.6 %
NEUTROPHILS ABSOLUTE: 4.5 K/UL (ref 1.7–7.7)
NEUTROPHILS RELATIVE PERCENT: 51.7 %
NITRITE, URINE: NEGATIVE
PDW BLD-RTO: 14 % (ref 12.4–15.4)
PH UA: 6.5 (ref 5–8)
PLATELET # BLD: 296 K/UL (ref 135–450)
PMV BLD AUTO: 8.2 FL (ref 5–10.5)
POTASSIUM REFLEX MAGNESIUM: 4.2 MMOL/L (ref 3.5–5.1)
PROTEIN UA: NEGATIVE MG/DL
RBC # BLD: 4.41 M/UL (ref 4–5.2)
SODIUM BLD-SCNC: 133 MMOL/L (ref 136–145)
SPECIFIC GRAVITY UA: 1.02 (ref 1–1.03)
TOTAL PROTEIN: 7.6 G/DL (ref 6.4–8.2)
TROPONIN: <0.01 NG/ML
URINE REFLEX TO CULTURE: NORMAL
URINE TYPE: NORMAL
UROBILINOGEN, URINE: 1 E.U./DL
WBC # BLD: 8.7 K/UL (ref 4–11)

## 2021-09-23 PROCEDURE — 93971 EXTREMITY STUDY: CPT

## 2021-09-23 PROCEDURE — 84484 ASSAY OF TROPONIN QUANT: CPT

## 2021-09-23 PROCEDURE — 93005 ELECTROCARDIOGRAM TRACING: CPT | Performed by: STUDENT IN AN ORGANIZED HEALTH CARE EDUCATION/TRAINING PROGRAM

## 2021-09-23 PROCEDURE — 85025 COMPLETE CBC W/AUTO DIFF WBC: CPT

## 2021-09-23 PROCEDURE — 81003 URINALYSIS AUTO W/O SCOPE: CPT

## 2021-09-23 PROCEDURE — 99284 EMERGENCY DEPT VISIT MOD MDM: CPT

## 2021-09-23 PROCEDURE — 6370000000 HC RX 637 (ALT 250 FOR IP): Performed by: PHYSICIAN ASSISTANT

## 2021-09-23 PROCEDURE — 71260 CT THORAX DX C+: CPT

## 2021-09-23 PROCEDURE — 6360000004 HC RX CONTRAST MEDICATION: Performed by: PHYSICIAN ASSISTANT

## 2021-09-23 PROCEDURE — 80053 COMPREHEN METABOLIC PANEL: CPT

## 2021-09-23 RX ADMIN — APIXABAN 10 MG: 5 TABLET, FILM COATED ORAL at 21:08

## 2021-09-23 RX ADMIN — APIXABAN 80 MG: 5 TABLET, FILM COATED ORAL at 21:10

## 2021-09-23 RX ADMIN — IOPAMIDOL 85 ML: 755 INJECTION, SOLUTION INTRAVENOUS at 19:51

## 2021-09-23 ASSESSMENT — PAIN DESCRIPTION - ORIENTATION: ORIENTATION: LEFT

## 2021-09-23 ASSESSMENT — PAIN DESCRIPTION - LOCATION: LOCATION: ARM

## 2021-09-23 ASSESSMENT — PAIN SCALES - GENERAL: PAINLEVEL_OUTOF10: 6

## 2021-09-23 ASSESSMENT — PAIN DESCRIPTION - FREQUENCY: FREQUENCY: CONTINUOUS

## 2021-09-23 ASSESSMENT — PAIN DESCRIPTION - PAIN TYPE: TYPE: ACUTE PAIN

## 2021-09-23 NOTE — ED NOTES
Pt take to room 4 pt getting on gown; CT scan taking pt to CT.       Evonne Kanner, RN  09/23/21 0345

## 2021-09-24 LAB
EKG ATRIAL RATE: 81 BPM
EKG DIAGNOSIS: NORMAL
EKG P AXIS: 55 DEGREES
EKG P-R INTERVAL: 134 MS
EKG Q-T INTERVAL: 402 MS
EKG QRS DURATION: 88 MS
EKG QTC CALCULATION (BAZETT): 466 MS
EKG R AXIS: 59 DEGREES
EKG T AXIS: 37 DEGREES
EKG VENTRICULAR RATE: 81 BPM

## 2021-09-24 PROCEDURE — 93010 ELECTROCARDIOGRAM REPORT: CPT | Performed by: INTERNAL MEDICINE

## 2021-09-24 NOTE — ED PROVIDER NOTES
I independently examined and evaluated Peggy Kc. In brief, Peggy Kc is a 39 y.o. female with a past medical history of obesity and ovarian cancer, who presents to the ED complaining of right upper extremity DVT. Patient recently had a port placed on 9/18 as she prepares to undergo chemotherapy for ovarian cancer. Patient was noted to have swelling in her left arm for 3 days and followed up with PCP and was sent for ultrasound which showed evidence of DVT. Patient was sent to the emergency room for further evaluation. Her primary care physician also prescribed her Eliquis. She does report some mild chest pain. Denies cough, fever, hemoptysis, shortness of breath. Stress Test 2018  Summary   Normal exercise EKG. Fan prognostic treadmill score suggests low-risk (score in range of >= +5). REVIEW OF SYSTEMS  All systems reviewed, pertinent positives per HPI otherwise noted to be negative. Focused exam revealed   PHYSICAL EXAM  BP (!) 173/109   Pulse 85   Temp 98.2 °F (36.8 °C) (Oral)   Resp 18   Ht 5' 2\" (1.575 m)   Wt 195 lb (88.5 kg)   LMP 04/24/2011   SpO2 98%   BMI 35.67 kg/m²    GENERAL APPEARANCE: Awake and alert. Cooperative. no distress. HENT: Normocephalic. Atraumatic. Mucous membranes are moist  NECK: Supple. Full range of motion of the neck without stiffness or pain. EYES: PERRL. EOM's grossly intact. HEART/CHEST: RRR. No murmurs. Chest wall is not tender to palpation. LUNGS: Respirations unlabored. CTAB. Good air exchange. Speaking comfortably in full sentences. ABDOMEN: No tenderness. Soft. Non-distended. No masses. No organomegaly. No guarding or rebound. MUSCULOSKELETAL: Tenderness to palpation of the left axilla. . Compartments soft. No deformity. No tenderness in the extremities. All extremities neurovascularly intact. SKIN: Warm and dry. No acute rashes. NEUROLOGICAL: Alert and oriented. No gross facial drooping. Strength 5/5, sensation intact. details of the patient's emergency department visit, please see the advanced practice provider's documentation. Comment: Please note this report has been produced using speech recognition software and may contain errors related to that system including errors in grammar, punctuation, and spelling, as well as words and phrases that may be inappropriate. If there are any questions or concerns please feel free to contact the dictating provider for clarification.         Esvin Wood MD  10/03/21 4003

## 2021-09-26 ASSESSMENT — ENCOUNTER SYMPTOMS
CONSTIPATION: 0
VOMITING: 0
SORE THROAT: 0
RHINORRHEA: 0
DIARRHEA: 0
BACK PAIN: 0
EYE PAIN: 0
ABDOMINAL PAIN: 0
COUGH: 0
NAUSEA: 0
SHORTNESS OF BREATH: 0

## 2021-09-26 NOTE — ED PROVIDER NOTES
[address] ED  EMERGENCY DEPARTMENT ENCOUNTER        Pt Name: Marion Felipe  MRN: [id number]  [place] 1975  Date of evaluation: 9/23/2021  Provider: Hui Sanchez PA-C  PCP: STEPHANIE Pan CNP  Note Started: 10:11 AM EDT      MATTY. I have evaluated this patient. My supervising physician was available for consultation. Triage CHIEF COMPLAINT       Chief Complaint   Patient presents with    Abnormal Test Results     had an ultrasound today showing DVT to left arm; port was place 1 week ago;  intermittent pain with breathing: September 15th started treatment for ovarian cancer         HISTORY OF PRESENT ILLNESS   (Location/Symptom, Timing/Onset, Context/Setting, Quality, Duration, Modifying Factors, Severity)  Note limiting factors. Chief Complaint: Left arm DVT    Marion Felipe is a 39 y.o. female who presents to the emergency department, the patient has a history of ovarian cancer, is getting ready to do chemotherapy, had a port placed on 913 of this year, and she states that she went back for a checkup, noted that she was having pain to her left arm and some swelling for the last 3 days. Her primary physician sent her to get a Doppler study, which was positive for a DVT. She was sent here to the emergency department but in the meantime her physician called in a prescription for an anticoagulant Eliquis. And because she was having a little bit of some chest pain she decided to come to the emergency department. She denies any fevers or chills, abdominal pain, she denies any dyspnea with exertion. Nursing Notes were all reviewed and agreed with or any disagreements were addressed in the HPI. REVIEW OF SYSTEMS    (2-9 systems for level 4, 10 or more for level 5)     Review of Systems   Constitutional: Negative for chills, diaphoresis and fever. HENT: Negative for congestion, ear pain, rhinorrhea and sore throat.     Eyes: Negative for pain and visual disturbance. Respiratory: Negative for cough and shortness of breath. Cardiovascular: Positive for chest pain. Negative for palpitations and leg swelling. Gastrointestinal: Negative for abdominal pain, constipation, diarrhea, nausea and vomiting. Genitourinary: Negative for decreased urine volume, dysuria, frequency and urgency. Musculoskeletal: Negative for back pain and neck pain. Skin: Negative for rash and wound. Neurological: Negative for dizziness and light-headedness.        PAST MEDICAL HISTORY     Past Medical History:   Diagnosis Date    Acid reflux     Carcinoma in situ of cervix     Endometriosis     Hyperlipidemia     Hypertension     Thyroid disease     hypothyroidism       SURGICAL HISTORY     Past Surgical History:   Procedure Laterality Date    ABDOMEN SURGERY  11    laparoscopic cholecystectomy     SECTION      CHOLECYSTECTOMY      COLONOSCOPY  10/8/12    normal    DENTAL SURGERY  2003    EXTRACTION OF WISDOM TEETH    HYSTERECTOMY  2011    LEEP      OTHER SURGICAL HISTORY  2021    PORT INSERTION    PORT SURGERY N/A 2021    PORT INSERTION performed by Cielo Redman MD at [place] [address]  2011    UPPER GASTROINTESTINAL ENDOSCOPY      bravo       [address]       Discharge Medication List as of 2021  9:19 PM      CONTINUE these medications which have NOT CHANGED    Details   cetirizine (ZYRTEC) 10 MG tablet Take 10 mg by mouth dailyHistorical Med      ARMOUR THYROID 30 MG tablet TAKE ONE TABLET BY MOUTH DAILY, Disp-30 tablet, R-5, DAWNormal      buPROPion (WELLBUTRIN XL) 150 MG extended release tablet Take 150 mg by mouth dailyHistorical Med      omeprazole (PRILOSEC) 40 MG delayed release capsule Take 40 mg by mouth dailyHistorical Med             ALLERGIES     Latex, Sulfa antibiotics, and Other    FAMILYHISTORY       Family History   Problem Relation Age of Onset    Diabetes Mother    Dianna Veloz High Blood Pressure Mother     High Cholesterol Mother     High Blood Pressure Father     Kidney Disease Sister         SOCIAL HISTORY       Social History     Socioeconomic History    Marital status:      Spouse name: None    Number of children: None    Years of education: None    Highest education level: None   Occupational History    None   Tobacco Use    Smoking status: Former Smoker     Packs/day: 0.25     Years: 2.00     Pack years: 0.50     Types: Cigarettes     Quit date: 1998     Years since quittin.7    Smokeless tobacco: Never Used   Vaping Use    Vaping Use: Never used   Substance and Sexual Activity    Alcohol use: Yes     Comment: 2 drinks per year    Drug use: No    Sexual activity: Yes   Other Topics Concern    None   Social History Narrative    None     Social Determinants of Health     Financial Resource Strain:     Difficulty of Paying Living Expenses:    Food Insecurity:     Worried About Running Out of Food in the Last Year:     Ran Out of Food in the Last Year:    Transportation Needs:     Lack of Transportation (Medical):      Lack of Transportation (Non-Medical):    Physical Activity:     Days of Exercise per Week:     Minutes of Exercise per Session:    Stress:     Feeling of Stress :    Social Connections:     Frequency of Communication with Friends and Family:     Frequency of Social Gatherings with Friends and Family:     Attends [employer] Services:     Active Member of Clubs or Organizations:     Attends Club or Organization Meetings:     Marital Status:    Intimate Partner Violence:     Fear of Current or Ex-Partner:     Emotionally Abused:     Physically Abused:     Sexually Abused:        SCREENINGS             PHYSICAL EXAM    (up to 7 for level 4, 8 or more for level 5)     ED Triage Vitals [21 1641]   BP Temp Temp Source Pulse Resp SpO2 Height Weight   (!) 173/109 98.2 °F (36.8 °C) Oral 93 18 98 % 5' 2\" (1.575 m) 195 lb (88.5 kg)       Physical Exam  Vitals and nursing note reviewed. Constitutional:       Appearance: Normal appearance. She is well-developed. She is not ill-appearing or diaphoretic. HENT:      Head: Normocephalic and atraumatic. Right Ear: External ear normal.      Left Ear: External ear normal.      Nose: Nose normal.   Eyes:      General:         Right eye: No discharge. Left eye: No discharge. Cardiovascular:      Rate and Rhythm: Normal rate and regular rhythm. Heart sounds: Normal heart sounds. No murmur heard. No friction rub. No gallop. Pulmonary:      Effort: Pulmonary effort is normal. No respiratory distress. Breath sounds: Normal breath sounds. No stridor. No wheezing or rales. Chest:      Chest wall: No tenderness. Musculoskeletal:         General: Normal range of motion. Arms:       Cervical back: Normal range of motion and neck supple. Skin:     General: Skin is warm and dry. Coloration: Skin is not pale. Neurological:      General: No focal deficit present. Mental Status: She is alert and oriented to person, place, and time.    Psychiatric:         Mood and Affect: Mood normal.         Behavior: Behavior normal.         DIAGNOSTIC RESULTS   LABS:    Labs Reviewed   COMPREHENSIVE METABOLIC PANEL W/ REFLEX TO MG FOR LOW K - Abnormal; Notable for the following components:       Result Value    Sodium 133 (*)     Chloride 96 (*)     Glucose 111 (*)     ALT 43 (*)     All other components within normal limits    Narrative:     Performed at:  [address] [address],  [place], [facility]   Phone [phone]   CBC WITH AUTO DIFFERENTIAL    Narrative:     Performed at:  [address] [place] [address],  [place], [facility]   Phone [phone]   URINE RT REFLEX TO CULTURE    Narrative:     Performed at:  [facility]) [place] [address],  [place], [place] Indications:Patient reports left arm swelling and pain with a recent port placement for about 4 days. Venous Duplex Scan: B-mode imaging of the deep and superficial veins, with compression maneuvers, including color and Doppler spectral waveform analysis. Impressions Right Impression There is no evidence of deep venous thrombosis seen involving the right subclavian vein. Left Impression Acute deep vein thrombosis is seen involving the left subclavian and axillary veins. There is no other evidence of deep or superficial venous thrombosis involving the left upper extremity. Conclusions   Summary   Acute deep vein thrombosis is seen involving the left subclavian and  axillary veins. Signature   ------------------------------------------------------------------  Electronically signed by Natalie Sorensen MD (Interpreting  physician) on 09/23/2021 at 08:37 PM  ------------------------------------------------------------------    CT CHEST PULMONARY EMBOLISM W CONTRAST    Result Date: 9/23/2021  EXAMINATION: CTA OF THE CHEST 9/23/2021 7:51 pm TECHNIQUE: CTA of the chest was performed after the administration of intravenous contrast.  Multiplanar reformatted images are provided for review. MIP images are provided for review. Dose modulation, iterative reconstruction, and/or weight based adjustment of the mA/kV was utilized to reduce the radiation dose to as low as reasonably achievable. COMPARISON: None.  HISTORY: ORDERING SYSTEM PROVIDED HISTORY: Subclavian DVT TECHNOLOGIST PROVIDED HISTORY: Reason for exam:->Subclavian DVT Decision Support Exception - unselect if not a suspected or confirmed emergency medical condition->Emergency Medical Condition (MA) Reason for Exam: Abnormal Test Results (had an ultrasound today showing DVT to left arm; port was place 1 week ago;  intermittent pain with breathing: September 15th started treatment for ovarian cancer) FINDINGS: Pulmonary Arteries: Pulmonary arteries are adequately opacified for evaluation. No evidence of intraluminal filling defect to suggest pulmonary embolism. Main pulmonary artery is normal in caliber. Mediastinum: No evidence of mediastinal lymphadenopathy. The heart and pericardium demonstrate no acute abnormality. There is no acute abnormality of the thoracic aorta. Lungs/pleura: The lungs are without acute process. No focal consolidation or pulmonary edema. No evidence of pleural effusion or pneumothorax. Upper Abdomen: Multiple cholecystectomy clips are noted. There is mild hepatic steatosis. A 2 mm hypodensity in the left lobe of the liver is present, too small to characterize, most likely benign. No follow-up is recommended. Soft Tissues/Bones: No acute bone or soft tissue abnormality. No acute pulmonary embolus. No acute cardiopulmonary disease. Hepatic steatosis. PROCEDURES   Unless otherwise noted below, none     Procedures    CRITICAL CARE TIME   N/A    CONSULTS:  None      EMERGENCY DEPARTMENT COURSE and DIFFERENTIAL DIAGNOSIS/MDM:   Vitals:    Vitals:    09/23/21 1641 09/23/21 2008 09/23/21 2152   BP: (!) 173/109  (!) 150/78   Pulse: 93 85 88   Resp: 18 18 20   Temp: 98.2 °F (36.8 °C)     TempSrc: Oral     SpO2: 98% 98% 99%   Weight: 195 lb (88.5 kg)     Height: 5' 2\" (1.575 m)         Patient was given thefollowing medications:  Medications   iopamidol (ISOVUE-370) 76 % injection 85 mL (85 mLs IntraVENous Given 9/23/21 1951)   apixaban (ELIQUIS) tablet 10 mg (10 mg Oral Given 9/23/21 2108)          Differential diagnosis includes pulmonary embolus, also ACS. Laboratory work-up was unremarkable, CT scan was negative for acute PE, we will start her Eliquis here, and have her follow-up with her oncologist     FINAL IMPRESSION      1.  Arm DVT (deep venous thromboembolism), acute, left [facility])          DISPOSITION/PLAN   DISPOSITION Decision To Discharge 09/23/2021 09:29:08 PM      PATIENT REFERREDTO:  See your oncologist    Call in 1 day  For a recheck in 2-7 days      DISCHARGE MEDICATIONS:  Discharge Medication List as of 9/23/2021  9:19 PM          DISCONTINUED MEDICATIONS:  Discharge Medication List as of 9/23/2021  9:19 PM                 (Please note that portions ofthis note were completed with a voice recognition program.  Efforts were made to edit the dictations but occasionally words are mis-transcribed.)    Tito Rodriguez PA-C (electronically signed)           Tito Rodriguez PA-C  09/26/21 1019

## 2021-10-25 ENCOUNTER — TELEPHONE (OUTPATIENT)
Dept: ENDOCRINOLOGY | Age: 46
End: 2021-10-25

## 2021-10-25 NOTE — TELEPHONE ENCOUNTER
Pt Called stating that her refill request for [place] thyroid had been denied due to no f/u appt. Pt Scheduled a future appt on 11/11/21 with Dr Mal Leonard.     ARMOUR THYROID 30 MG tablet [[id number]]     Order Details  Dose, Route, Frequency: As Directed   Dispense Quantity: 30 tablet Refills: 5          Sig: TAKE ONE TABLET BY MOUTH DAILY           Roberto Ruiz [place], [place] - [facility] Mamadou Blackwell [phone]   [address] [place]   Phone:  [phone]  Fax:  [phone]

## 2021-10-26 DIAGNOSIS — E03.9 ACQUIRED HYPOTHYROIDISM: ICD-10-CM

## 2021-10-27 NOTE — TELEPHONE ENCOUNTER
Medication:   Requested Prescriptions     Pending Prescriptions Disp Refills    ARMOUR THYROID 30 MG tablet [Pharmacy Med Name: ARMOUR THYROID 30 MG TABLET] 30 tablet 5     Sig: TAKE ONE TABLET BY MOUTH DAILY       Last Filled:  04/13/2021    Patient Phone Number: [phone] (home) [phone] (work)    Last appt: 2/18/2019   Next appt: 11/11/2021    Last Labs DM:   Lab Results   Component Value Date    LABA1C 5.3 03/12/2021

## 2021-10-28 RX ORDER — THYROID 30 MG/1
TABLET ORAL
Qty: 30 TABLET | Refills: 5 | Status: SHIPPED | OUTPATIENT
Start: 2021-10-28

## 2021-11-09 NOTE — PROGRESS NOTES
Obstetric/Gynecologic History  Number of pregnancies: 1  Births: 1  Age at First Birth:  31  Age at Menstruation:  17  Still Menstruating? No  Hysterectomy? Yes - and ovaries removed  (one ovary removed 10 years ago, and then one removed recently)    Age at first mammogram: 30  Frequency of mammograms: yearly  Bra/Cup size: 38DD    History of breastfeeding? Yes   History of OCP Use? Yes for 10 years and is not currently taking  History of hormone use? Never.   (took hormones for 3 months to get pregnant)      Review of Systems   Constitutional: Positive for unexpected weight change (r/t steriod use). Eyes: Negative for visual disturbance. Respiratory: Negative for cough and shortness of breath. Cardiovascular: Negative for chest pain and palpitations. Gastrointestinal: Negative for abdominal pain. Musculoskeletal: Negative for arthralgias and myalgias. Neurological: Negative for headaches. Hematological: Negative for adenopathy. Does not bruise/bleed easily. Psychiatric/Behavioral: Negative for dysphoric mood. The patient is nervous/anxious (hx of depression).

## 2021-11-11 ENCOUNTER — VIRTUAL VISIT (OUTPATIENT)
Dept: ENDOCRINOLOGY | Age: 46
End: 2021-11-11
Payer: COMMERCIAL

## 2021-11-11 DIAGNOSIS — E03.9 ACQUIRED HYPOTHYROIDISM: Primary | ICD-10-CM

## 2021-11-11 PROCEDURE — G8427 DOCREV CUR MEDS BY ELIG CLIN: HCPCS | Performed by: NURSE PRACTITIONER

## 2021-11-11 PROCEDURE — G8417 CALC BMI ABV UP PARAM F/U: HCPCS | Performed by: NURSE PRACTITIONER

## 2021-11-11 PROCEDURE — 1036F TOBACCO NON-USER: CPT | Performed by: NURSE PRACTITIONER

## 2021-11-11 PROCEDURE — 99213 OFFICE O/P EST LOW 20 MIN: CPT | Performed by: NURSE PRACTITIONER

## 2021-11-11 PROCEDURE — G8484 FLU IMMUNIZE NO ADMIN: HCPCS | Performed by: NURSE PRACTITIONER

## 2021-11-11 RX ORDER — APIXABAN 5 MG/1
5 TABLET, FILM COATED ORAL 2 TIMES DAILY
COMMUNITY
Start: 2021-10-19

## 2021-11-11 ASSESSMENT — ENCOUNTER SYMPTOMS
COLOR CHANGE: 0
EYE PAIN: 0
SHORTNESS OF BREATH: 0
CONSTIPATION: 0
DIARRHEA: 0
NAUSEA: 0

## 2021-11-11 NOTE — PROGRESS NOTES
 Smokeless tobacco: Never Used   Substance and Sexual Activity    Alcohol use: Yes     Comment: 2 drinks per year    Drug use: No      Thyroid:   Alka Ang has a h/o hypothyroidism for > 5 years. Patient has been on varying thyroid medications and is currently on [place] 30 mcg. Was out of meds for 10 days  Confirms that it is taken in early am on an empty stomach. Clarified that nothing to eat for at least 30 minutes after and no iron or calcium for at.least 3-4 hours after. Current symptoms include fatigue, weight gain, change in skin,  nails, or hair. Patient denies denies heat/cold intolerance, bowel changes or CVS symptoms.      Obstructive symptoms  - Change in voice no  - Trouble swallowing no    Predisposing factors for thyroid disease  Exposure to radiation (neck) no  Exposure to iodine no  FH of thyroid disease no  FH of thyroid or other cancers no     Lab Results   Component Value Date    TSH 1.64 03/12/2021    TSH 3.22 03/03/2020    TSH 2.14 02/18/2019    FT3 3.6 03/12/2021    FT3 3.5 03/03/2020    FT3 3.6 02/18/2019    T4FREE 1.0 03/12/2021    T4FREE 0.9 03/03/2020    T4FREE 0.8 02/18/2019     Past Medical History:   Diagnosis Date    Acid reflux     Carcinoma in situ of cervix     Endometriosis     Hyperlipidemia     Hypertension     Thyroid disease     hypothyroidism     Family History   Problem Relation Age of Onset    Diabetes Mother     High Blood Pressure Mother     High Cholesterol Mother     High Blood Pressure Father     Kidney Disease Sister      Current Outpatient Medications   Medication Sig Dispense Refill    ELIQUIS 5 MG TABS tablet Take 5 mg by mouth 2 times daily      ARMOUR THYROID 30 MG tablet TAKE ONE TABLET BY MOUTH DAILY 30 tablet 5    cetirizine (ZYRTEC) 10 MG tablet Take 10 mg by mouth daily      buPROPion (WELLBUTRIN XL) 150 MG extended release tablet Take 150 mg by mouth daily      omeprazole (PRILOSEC) 40 MG delayed release capsule Take 40 mg by mouth daily       No current facility-administered medications for this visit. Review of Systems   Constitutional: Negative for activity change, appetite change, diaphoresis, fever and unexpected weight change. HENT: Negative for dental problem. Eyes: Negative for pain and visual disturbance. Respiratory: Negative for shortness of breath. Cardiovascular: Negative for chest pain, palpitations and leg swelling. Gastrointestinal: Negative for constipation, diarrhea and nausea. Endocrine: Negative for cold intolerance, heat intolerance, polydipsia, polyphagia and polyuria. Genitourinary: Negative for frequency and urgency. Musculoskeletal: Negative for arthralgias, joint swelling and myalgias. Skin: Negative for color change and pallor. Neurological: Negative for weakness, numbness and headaches. Psychiatric/Behavioral: Negative for dysphoric mood and sleep disturbance. The patient is not nervous/anxious. There were no vitals filed for this visit. televisit    Physical Exam  Vitals reviewed. Constitutional:       Appearance: She is well-developed. Eyes:      Pupils: Pupils are equal, round, and reactive to light. Neck:      Thyroid: No thyromegaly. Cardiovascular:      Rate and Rhythm: Normal rate and regular rhythm. Heart sounds: Normal heart sounds. Pulmonary:      Effort: Pulmonary effort is normal.      Breath sounds: Normal breath sounds. Abdominal:      General: There is no distension. Musculoskeletal:         General: Normal range of motion. Cervical back: Normal range of motion. Skin:     General: Skin is warm and dry. Comments: No skin changes or evidence of trauma. Neurological:      Mental Status: She is alert and oriented to person, place, and time. Sensory: No sensory deficit. Psychiatric:         Behavior: Behavior normal.         Thought Content:  Thought content normal.       Assessment  Rafael Ayala is a 39 y.o. female with hypothyroidism and associated with obesity, prediabetes and currently on chemo for ovarian cancer    Plan  Problem List Items Addressed This Visit     Hypothyroidism - Primary     Was out of medication for 10 days  PA done recently and has been able to refill rx  Will Repeat TFT in 4-6 weeks    Further management based on results  Ensure off of biotin for 2-3 days prior to labs           Relevant Orders    T3, Free    T4, Free    TSH without Reflex        Return in about 6 months (around 5/11/2022).

## 2021-11-11 NOTE — ASSESSMENT & PLAN NOTE
Was out of medication for 10 days  PA done recently and has been able to refill rx  Will Repeat TFT in 4-6 weeks    Further management based on results  Ensure off of biotin for 2-3 days prior to labs

## 2021-11-15 ENCOUNTER — HOSPITAL ENCOUNTER (OUTPATIENT)
Dept: WOMENS IMAGING | Age: 46
Discharge: HOME | End: 2021-11-15
Payer: COMMERCIAL

## 2021-11-15 ENCOUNTER — OFFICE VISIT (OUTPATIENT)
Dept: SURGERY | Age: 46
End: 2021-11-15
Payer: COMMERCIAL

## 2021-11-15 VITALS
TEMPERATURE: 98.1 F | HEART RATE: 73 BPM | HEIGHT: 62 IN | OXYGEN SATURATION: 97 % | RESPIRATION RATE: 18 BRPM | BODY MASS INDEX: 38.09 KG/M2 | WEIGHT: 207 LBS | DIASTOLIC BLOOD PRESSURE: 93 MMHG | SYSTOLIC BLOOD PRESSURE: 150 MMHG

## 2021-11-15 DIAGNOSIS — Z15.89 MONOALLELIC MUTATION OF CHEK2 GENE IN FEMALE PATIENT: ICD-10-CM

## 2021-11-15 DIAGNOSIS — Z12.31 SCREENING MAMMOGRAM FOR HIGH-RISK PATIENT: ICD-10-CM

## 2021-11-15 DIAGNOSIS — Z91.89 AT HIGH RISK FOR BREAST CANCER: Primary | ICD-10-CM

## 2021-11-15 DIAGNOSIS — Z15.02 MONOALLELIC MUTATION OF CHEK2 GENE IN FEMALE PATIENT: ICD-10-CM

## 2021-11-15 DIAGNOSIS — Z12.31 ENCOUNTER FOR SCREENING MAMMOGRAM FOR MALIGNANT NEOPLASM OF BREAST: ICD-10-CM

## 2021-11-15 DIAGNOSIS — Z15.09 MONOALLELIC MUTATION OF CHEK2 GENE IN FEMALE PATIENT: ICD-10-CM

## 2021-11-15 DIAGNOSIS — Z15.01 MONOALLELIC MUTATION OF CHEK2 GENE IN FEMALE PATIENT: ICD-10-CM

## 2021-11-15 PROCEDURE — G8417 CALC BMI ABV UP PARAM F/U: HCPCS | Performed by: SURGERY

## 2021-11-15 PROCEDURE — 77063 BREAST TOMOSYNTHESIS BI: CPT

## 2021-11-15 PROCEDURE — G8427 DOCREV CUR MEDS BY ELIG CLIN: HCPCS | Performed by: SURGERY

## 2021-11-15 PROCEDURE — 99204 OFFICE O/P NEW MOD 45 MIN: CPT | Performed by: SURGERY

## 2021-11-15 PROCEDURE — 1036F TOBACCO NON-USER: CPT | Performed by: SURGERY

## 2021-11-15 PROCEDURE — G8484 FLU IMMUNIZE NO ADMIN: HCPCS | Performed by: SURGERY

## 2021-11-15 RX ORDER — LORAZEPAM 0.5 MG/1
TABLET ORAL
COMMUNITY
Start: 2021-08-12 | End: 2022-09-29

## 2021-11-15 RX ORDER — LORATADINE 10 MG/1
10 TABLET ORAL EVERY EVENING
COMMUNITY

## 2021-11-15 RX ORDER — LIDOCAINE AND PRILOCAINE 25; 25 MG/G; MG/G
CREAM TOPICAL
COMMUNITY
Start: 2021-10-25

## 2021-11-15 RX ORDER — ONDANSETRON HYDROCHLORIDE 8 MG/1
TABLET, FILM COATED ORAL
COMMUNITY
Start: 2021-10-06 | End: 2022-09-29

## 2021-11-15 RX ORDER — OXYCODONE HYDROCHLORIDE 5 MG/1
TABLET ORAL
COMMUNITY
Start: 2021-08-20 | End: 2022-09-29

## 2021-11-15 ASSESSMENT — ENCOUNTER SYMPTOMS
ABDOMINAL PAIN: 0
COUGH: 0
SHORTNESS OF BREATH: 0

## 2021-11-15 NOTE — LETTER
[facility] Breast Surgery  [place] [address]  Phone: [phone]  Fax: [phone]           Bonilla Norman DO      November 16, 2021     Patient: Ines Gross   MR Number: <[id number]>   YOB: 1975   Date of Visit: 11/15/2021       Dear Ms. Tara Rogersting: Thank you for referring Kadi Gonzales to me for evaluation/treatment. Below are the relevant portions of my assessment and plan of care. If you have questions, please do not hesitate to call me. I look forward to following French Cheatham along with you.     Sincerely,        Bonilla Norman DO    CC providers:  STEPHANIE Hauser - CNP  [address] [facility] [address]  [place]

## 2021-11-15 NOTE — PROGRESS NOTES
BREAST SURGICAL ONCOLOGY NEW PATIENT EVALUATION    11/15/21     Chief Complaint: CHEK2 mutation     History of Present Illness  Leno Aguayo is a 39 y.o. female  referred by STEPHANIE Irby * for elevated breast CA risk. Ms. Porter Peterson was diagnosed with ovarian CA after she was having abdominal distention, was sent for CT scan which revealed a mass. She had surgery with Dr. 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 She is currently undergoing chemotherapy, infusions q3 weeks (3 cycles of 6 complete). 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 She denies breast symptoms including palpating any breast masses, skin/nipple changes, nipple discharge or breast pain. No breast biopsies or surgeries. Family history is significant for ovarian cancer in her paternal aunt (dx 40), and also in a maternal aunt (dx 52,  46). No history of breast cancer in her family. Her mom had genetic testing due to her sister (pts aunt) having ovarian cancer, and she states that her mother also had a CHEK2 mutation. I reviewed her medical records. She is on eliquis due to a DVT in her left arm/port. Also notes that she follows with [facility] annually for GI issues, is on omeprazole. Had a colonoscopy many years ago. PCP - MARINA Schwartz MD - has f/u Dec 30 (then hopes to just keep following with her)  [facility] - Dr. Thao Gaming  Constitutional: Positive for unexpected weight change (r/t steriod use). Eyes: Negative for visual disturbance. Respiratory: Negative for cough and shortness of breath. Cardiovascular: Negative for chest pain and palpitations. Gastrointestinal: Negative for abdominal pain. Musculoskeletal: Negative for arthralgias and myalgias. Neurological: Negative for headaches. Hematological: Negative for adenopathy. Does not bruise/bleed easily. Psychiatric/Behavioral: Negative for dysphoric mood. The patient is nervous/anxious (hx of depression). I personally reviewed and agree with the above ROS as documented by my medical assistant. Obstetric/Gynecologic History  Number of pregnancies: 1  Births: 1  Age at First Birth:  31  Age at Menstruation:  17  Still Menstruating? No  Hysterectomy? Yes for endometriosis/carcinoma in situ - and ovaries removed  (one ovary removed 10 years ago, and then one removed recently)    Age at first mammogram: 30  Frequency of mammograms: yearly  Bra/Cup size: 38DD    History of breastfeeding? Yes   History of OCP Use? Yes for 10 years and is not currently taking  History of hormone use? Never.   (took hormones for 3 months to get pregnant)    Past Medical History      Diagnosis Date    Acid reflux     Carcinoma in situ of cervix     Endometriosis     Hyperlipidemia     Hypertension     Ovarian cancer ([address]     Ovarian [facility] [address] 2021    Thyroid disease     hypothyroidism        Past Surgical History      Procedure Laterality Date    ABDOMEN SURGERY  11    laparoscopic cholecystectomy     SECTION  2007    CHOLECYSTECTOMY      COLONOSCOPY  10/8/12    normal    DENTAL SURGERY  2003    EXTRACTION OF WISDOM TEETH    HYSTERECTOMY  2011    LEEP      OTHER SURGICAL HISTORY  2021    PORT INSERTION    OVARY REMOVAL      PORT SURGERY N/A 2021    [facility]    UPPER GASTROINTESTINAL ENDOSCOPY      bravo        Social History  Patient  reports that she quit smoking about 23 years ago. Her smoking use included cigarettes. She has a 0.50 pack-year smoking history. She has never used smokeless tobacco. She reports current alcohol use. She reports that she does not use drugs. Has 15 y/o son.     Family History  Family History   Problem Relation Age of there is no skin dimpling with movement of the pectoralis, there is no nipple retraction, no obvious nipple discharge, the parenchyma is mildly nodular, there are no dominant masses and the axillary tail is normal.   Left Breast: Examined with patient seated and supine. The skin, nipple, and areola appear normal, there is no skin dimpling with movement of the pectoralis, there is no nipple retraction, no obvious nipple discharge, the parenchyma is mildly nodular, there are no dominant masses and the axillary tail is normal.     ----------------------------------------------    IMAGING: Imaging was performed here and read by our radiologists. I personally reviewed the breast imaging performed today. Mammogram revealed no mammographic evidence of malignancy, BI-RADS 1. Breast density is described as heterogeneously dense. PATHOLOGY: There is no pathology to review at this time. GENETICS:  I personally reviewed the results from her genetic testing which are scanned in her media tab. She had a 36 gene panel through [employer] performed in October 2021. This was positive for a pathogenic mutation in CHEK2.    ----------------------------------------------    IMPRESSION:   39 y.o. female with  1. Elevated risk of breast cancer  2. CHEK2 mutation    PLAN:    I reviewed with Ms. Mary Janeyovani Jimenes that a CHEK2 mutation confers a breast cancer risk of about 2 times higher than the general population risk. Based on this gene mutation, I would recommend increased screening for breast cancer. We reviewed the most recent NCCN guidelines (version 1.2020) which recommend annual mammogram, and consideration of breast MRI starting at age 36. When MRI is incorporated into screening, we try to stagger the MRI and mammogram so that a study is performed every 6 months. We also reviewed that although MRI has high sensitivity, there may be false positives which could require additional evaluation and biopsies.  She would like to pursue annual MRIs. Also, I recommend monthly self breast exams and annual clinical breast exams. We did discuss that in certain high risk patients, it is reasonable to consider risk-reducing mastectomies. However, in the case of a CHEK2 mutation, there is insufficient evidence to support this procedure and this decision should be considered based on family history. In Ms. Ramos's case, there is no family history of breast cancer. At this time I do not recommend risk reduction surgery. She is also at increased risk for colon cancer and per NCCN guidelines, screening colonoscopy is recommended at age 36. She follows with GI and will call them to let them know about her gene mutation. In the interim, she would like to continue following with Dr. Gabo He for her GYN care if possible. If Dr. Gabo He does not do breast exams, then I recommend that I see her back in 6 months for an exam. Otherwise, I can see her back in 1 year on the same day as her b/l screening mammogram.     I answered all of her questions thoroughly, and she does seem pleased with this plan of approach. I encouraged her to contact me in the interim if any new questions or concerns arise. IN SUMMARY:  - Breast MRI in May - will call her with results  - f/u in 1 year on same day as screening Eugenia Robles, DO  Breast Surgical Oncology    [facility] Breast Surgery  Phone: [phone] (option 3) Attending Attestation (For Attendings USE Only)...

## 2021-11-15 NOTE — PATIENT INSTRUCTIONS
We will call you with MRI results; please schedule MRI for May. I recommend that you perform self breast exams once a month. Call the office with any concerns. Patient Education        Breast Self-Exam: Care Instructions  Your Care Instructions     A breast self-exam is when you check your breasts for lumps or changes. This regular exam helps you learn how your breasts normally look and feel. Most breast problems or changes are not because of cancer. Breast self-exam is not a substitute for a mammogram. Having regular breast exams by your doctor and regular mammograms improve your chances of finding any problems with your breasts. Some women set a time each month to do a step-by-step breast self-exam. Other women like a less formal system. They might look at their breasts as they brush their teeth, or feel their breasts once in a while in the shower. If you notice a change in your breast, tell your doctor. Follow-up care is a key part of your treatment and safety. Be sure to make and go to all appointments, and call your doctor if you are having problems. It's also a good idea to know your test results and keep a list of the medicines you take. How do you do a breast self-exam?  · The best time to examine your breasts is usually one week after your menstrual period begins. Your breasts should not be tender then. If you do not have periods, you might do your exam on a day of the month that is easy to remember. · To examine your breasts:  ? Remove all your clothes above the waist and lie down. When you are lying down, your breast tissue spreads evenly over your chest wall, which makes it easier to feel all your breast tissue. ? Use the pads--not the fingertips--of the 3 middle fingers of your left hand to check your right breast. Move your fingers slowly in small coin-sized circles that overlap. ? Use three levels of pressure to feel of all your breast tissue.  Use light pressure to feel the tissue close to the skin surface. Use medium pressure to feel a little deeper. Use firm pressure to feel your tissue close to your breastbone and ribs. Use each pressure level to feel your breast tissue before moving on to the next spot. ? Check your entire breast, moving up and down as if following a strip from the collarbone to the bra line, and from the armpit to the ribs. Repeat until you have covered the entire breast.  ? Repeat this procedure for your left breast, using the pads of the 3 middle fingers of your right hand. · To examine your breasts while in the shower:  ? Place one arm over your head and lightly soap your breast on that side. ? Using the pads of your fingers, gently move your hand over your breast (in the strip pattern described above), feeling carefully for any lumps or changes. ? Repeat for the other breast.  · Have your doctor inspect anything you notice to see if you need further testing. Where can you learn more? Go to https://[employer]."[employer]. org and sign in to your [employer] account. Enter P148 in the [facility] [facility] box to learn more about \"Breast Self-Exam: Care Instructions. \"     If you do not have an account, please click on the \"Sign Up Now\" link. Current as of: December 17, 2020               Content Version: 13.0  © 2006-2021 [employer]. Care instructions adapted under license by [facility] ([facility]). If you have questions about a medical condition or this instruction, always ask your healthcare professional. [place] [address] any warranty or liability for your use of this information.

## 2021-11-15 NOTE — LETTER
[facility] Breast Surgery  [address]  Phone: [phone]  Fax: [phone]    Pennie Kirby DO    November 16, 2021     STEPHANIE Wolfe [facility]  [address] [facility] [place] [address]    Patient: Kiya Gonzalez   MR Number: <[id number]>   YOB: 1975   Date of Visit: 11/15/2021       Dear Terrence Carr:    I saw Enedina Arteaga today for evaluation/treatment. Below are the relevant portions of my assessment and plan of care. If you have questions, please do not hesitate to call me. I look forward to following Rosita Royal along with you.     Sincerely,      Pennie Kirby DO

## 2022-02-08 ENCOUNTER — HOSPITAL ENCOUNTER (OUTPATIENT)
Age: 47
Discharge: HOME | End: 2022-02-08
Payer: COMMERCIAL

## 2022-02-08 DIAGNOSIS — E03.9 ACQUIRED HYPOTHYROIDISM: ICD-10-CM

## 2022-02-08 LAB
T3 FREE: 5.1 PG/ML (ref 2.3–4.2)
T4 FREE: 0.9 NG/DL (ref 0.9–1.8)
TSH SERPL DL<=0.05 MIU/L-ACNC: 2.61 UIU/ML (ref 0.27–4.2)

## 2022-02-08 PROCEDURE — 84439 ASSAY OF FREE THYROXINE: CPT

## 2022-02-08 PROCEDURE — 84443 ASSAY THYROID STIM HORMONE: CPT

## 2022-02-08 PROCEDURE — 36415 COLL VENOUS BLD VENIPUNCTURE: CPT

## 2022-02-08 PROCEDURE — 84481 FREE ASSAY (FT-3): CPT

## 2022-02-18 ENCOUNTER — HOSPITAL ENCOUNTER (OUTPATIENT)
Dept: INTERVENTIONAL RADIOLOGY/VASCULAR | Age: 47
Discharge: HOME | End: 2022-02-18
Payer: COMMERCIAL

## 2022-02-18 DIAGNOSIS — T82.9XXA VASCULAR PORT COMPLICATION, INITIAL ENCOUNTER: ICD-10-CM

## 2022-02-18 PROCEDURE — 36598 INJ W/FLUOR EVAL CV DEVICE: CPT

## 2022-02-18 PROCEDURE — 6360000004 HC RX CONTRAST MEDICATION: Performed by: RADIOLOGY

## 2022-02-18 RX ADMIN — IOVERSOL 20 ML: 678 INJECTION INTRA-ARTERIAL; INTRAVENOUS at 10:48

## 2022-05-16 ENCOUNTER — HOSPITAL ENCOUNTER (OUTPATIENT)
Dept: MRI IMAGING | Age: 47
Discharge: HOME | End: 2022-05-16
Payer: COMMERCIAL

## 2022-05-16 DIAGNOSIS — Z15.01 MONOALLELIC MUTATION OF CHEK2 GENE IN FEMALE PATIENT: ICD-10-CM

## 2022-05-16 DIAGNOSIS — Z15.02 MONOALLELIC MUTATION OF CHEK2 GENE IN FEMALE PATIENT: ICD-10-CM

## 2022-05-16 DIAGNOSIS — Z15.09 MONOALLELIC MUTATION OF CHEK2 GENE IN FEMALE PATIENT: ICD-10-CM

## 2022-05-16 DIAGNOSIS — Z15.89 MONOALLELIC MUTATION OF CHEK2 GENE IN FEMALE PATIENT: ICD-10-CM

## 2022-05-16 DIAGNOSIS — Z91.89 AT HIGH RISK FOR BREAST CANCER: ICD-10-CM

## 2022-05-16 PROCEDURE — 6360000004 HC RX CONTRAST MEDICATION: Performed by: SURGERY

## 2022-05-16 PROCEDURE — 77049 MRI BREAST C-+ W/CAD BI: CPT

## 2022-05-16 PROCEDURE — A9579 GAD-BASE MR CONTRAST NOS,1ML: HCPCS | Performed by: SURGERY

## 2022-05-16 RX ADMIN — GADOTERIDOL 20 ML: 279.3 INJECTION, SOLUTION INTRAVENOUS at 12:45

## 2022-06-09 NOTE — PROGRESS NOTES
Tried calling pt to let her know that Janay reviewed her glucose log and said that overall the readings look better. Janay said to continue the same for now. Pt did not answer but a detailed voicemail was left.   X-RAY RESULTS ARE BACK AND NORMAL

## 2022-06-24 ENCOUNTER — HOSPITAL ENCOUNTER (OUTPATIENT)
Dept: CT IMAGING | Age: 47
Discharge: HOME | End: 2022-06-24
Payer: COMMERCIAL

## 2022-06-24 DIAGNOSIS — C56.2 MALIGNANT NEOPLASM OF LEFT OVARY (HCC): ICD-10-CM

## 2022-06-24 PROCEDURE — 74177 CT ABD & PELVIS W/CONTRAST: CPT

## 2022-06-24 PROCEDURE — 85610 PROTHROMBIN TIME: CPT

## 2022-06-24 PROCEDURE — 85347 COAGULATION TIME ACTIVATED: CPT

## 2022-06-24 PROCEDURE — 83880 ASSAY OF NATRIURETIC PEPTIDE: CPT

## 2022-06-24 PROCEDURE — 82803 BLOOD GASES ANY COMBINATION: CPT

## 2022-06-24 PROCEDURE — 82565 ASSAY OF CREATININE: CPT

## 2022-06-24 PROCEDURE — 83605 ASSAY OF LACTIC ACID: CPT

## 2022-06-24 PROCEDURE — 84520 ASSAY OF UREA NITROGEN: CPT

## 2022-06-24 PROCEDURE — 80051 ELECTROLYTE PANEL: CPT

## 2022-06-24 PROCEDURE — 85014 HEMATOCRIT: CPT

## 2022-06-24 PROCEDURE — 84484 ASSAY OF TROPONIN QUANT: CPT

## 2022-06-24 PROCEDURE — 6360000004 HC RX CONTRAST MEDICATION: Performed by: INTERNAL MEDICINE

## 2022-06-24 PROCEDURE — 82330 ASSAY OF CALCIUM: CPT

## 2022-06-24 RX ADMIN — IOPAMIDOL 75 ML: 755 INJECTION, SOLUTION INTRAVENOUS at 10:01

## 2022-06-24 RX ADMIN — IOHEXOL 50 ML: 240 INJECTION, SOLUTION INTRATHECAL; INTRAVASCULAR; INTRAVENOUS; ORAL at 10:01

## 2022-07-06 LAB
GFR AFRICAN AMERICAN: >60
GFR NON-AFRICAN AMERICAN: >60
PERFORMED ON: NORMAL
POC CREATININE: 0.8 MG/DL (ref 0.6–1.1)
POC SAMPLE TYPE: NORMAL

## 2022-09-21 ENCOUNTER — HOSPITAL ENCOUNTER (OUTPATIENT)
Dept: CT IMAGING | Age: 47
Discharge: HOME | End: 2022-09-21
Payer: COMMERCIAL

## 2022-09-21 DIAGNOSIS — C56.2 MALIGNANT NEOPLASM OF LEFT OVARY (HCC): ICD-10-CM

## 2022-09-21 PROCEDURE — 74177 CT ABD & PELVIS W/CONTRAST: CPT

## 2022-09-21 PROCEDURE — 82565 ASSAY OF CREATININE: CPT

## 2022-09-21 PROCEDURE — 6360000004 HC RX CONTRAST MEDICATION: Performed by: INTERNAL MEDICINE

## 2022-09-21 RX ADMIN — IOPAMIDOL 75 ML: 755 INJECTION, SOLUTION INTRAVENOUS at 09:05

## 2022-09-21 RX ADMIN — DIATRIZOATE MEGLUMINE AND DIATRIZOATE SODIUM 12 ML: 660; 100 LIQUID ORAL; RECTAL at 09:02

## 2022-09-29 ENCOUNTER — OFFICE VISIT (OUTPATIENT)
Dept: ENT CLINIC | Age: 47
End: 2022-09-29
Payer: COMMERCIAL

## 2022-09-29 VITALS
HEART RATE: 63 BPM | BODY MASS INDEX: 38.64 KG/M2 | TEMPERATURE: 97.4 F | HEIGHT: 62 IN | WEIGHT: 210 LBS | DIASTOLIC BLOOD PRESSURE: 84 MMHG | SYSTOLIC BLOOD PRESSURE: 143 MMHG

## 2022-09-29 DIAGNOSIS — R09.81 NASAL CONGESTION: Primary | ICD-10-CM

## 2022-09-29 DIAGNOSIS — K21.9 GASTROESOPHAGEAL REFLUX DISEASE, UNSPECIFIED WHETHER ESOPHAGITIS PRESENT: ICD-10-CM

## 2022-09-29 DIAGNOSIS — R09.89 GLOBUS SENSATION: ICD-10-CM

## 2022-09-29 PROCEDURE — 99203 OFFICE O/P NEW LOW 30 MIN: CPT | Performed by: OTOLARYNGOLOGY

## 2022-09-29 PROCEDURE — G8417 CALC BMI ABV UP PARAM F/U: HCPCS | Performed by: OTOLARYNGOLOGY

## 2022-09-29 PROCEDURE — G8427 DOCREV CUR MEDS BY ELIG CLIN: HCPCS | Performed by: OTOLARYNGOLOGY

## 2022-09-29 PROCEDURE — 31575 DIAGNOSTIC LARYNGOSCOPY: CPT | Performed by: OTOLARYNGOLOGY

## 2022-09-29 PROCEDURE — 1036F TOBACCO NON-USER: CPT | Performed by: OTOLARYNGOLOGY

## 2022-09-29 RX ORDER — AZELASTINE 1 MG/ML
1 SPRAY, METERED NASAL 2 TIMES DAILY
Qty: 60 ML | Refills: 3 | Status: SHIPPED | OUTPATIENT
Start: 2022-09-29

## 2022-09-29 ASSESSMENT — ENCOUNTER SYMPTOMS
SHORTNESS OF BREATH: 0
TROUBLE SWALLOWING: 0
FACIAL SWELLING: 0
SORE THROAT: 0
EYE ITCHING: 0
SINUS PRESSURE: 0
APNEA: 0
VOICE CHANGE: 0
COUGH: 0

## 2022-09-29 NOTE — PROGRESS NOTES
[address], [address], [address] [place] [address]  P: [phone]       Patient     Gómez Obando  1975    ChiefComplaint     Chief Complaint   Patient presents with    Pharyngitis     Patient is here today because she feels like something is in the back of her throat. Patient states for the past few weeks it feels like there is something in her throat. She also states that sometimes she can not breathe very well. She also says sometimes it feelslike something is sitting on her throat. Patient denies trouble swallowing. History of Present Illness     Garrett Chavarria is a 45-year-old female here today for evaluation of intermittent globus. States for the last month she has a sensation of something intermittently being present in her throat and perception of shortness of breath/difficulty breathing. No difficulty swallowing. Sensation will intermittently occur throughout the day without identifiable trigger. Known acid reflux on medication feels like it is well controlled. Recently switched to Nasacort for allergy control but still feels congested.   History significant for ovarian cancer, completed treatment 2021    Past Medical History     Past Medical History:   Diagnosis Date    Acid reflux     Carcinoma in situ of cervix     Endometriosis     Hyperlipidemia     Hypertension     Ovarian cancer ([address]     Ovarian cancer ([address] 2021    Thyroid disease     hypothyroidism       Past Surgical History     Past Surgical History:   Procedure Laterality Date    ABDOMEN SURGERY  11    laparoscopic cholecystectomy     SECTION  2007    CHOLECYSTECTOMY      COLONOSCOPY  10/8/12    normal    DENTAL SURGERY  2003    EXTRACTION OF WISDOM TEETH    HYSTERECTOMY (CERVIX STATUS UNKNOWN)  2011    LEEP      OTHER SURGICAL HISTORY  2021    PORT INSERTION    OVARY REMOVAL      PARTIAL HYSTERECTOMY (CERVIX NOT REMOVED)      PORT SURGERY N/A 2021    PORT INSERTION Review of Systems     Review of Systems   Constitutional:  Negative for appetite change, chills, fatigue, fever and unexpected weight change. HENT:  Positive for congestion. Negative for ear discharge, ear pain, facial swelling, hearing loss, nosebleeds, postnasal drip, sinus pressure, sneezing, sore throat, tinnitus, trouble swallowing and voice change. Eyes:  Negative for itching. Respiratory:  Negative for apnea, cough and shortness of breath. Endocrine: Negative for cold intolerance and heat intolerance. Musculoskeletal:  Negative for myalgias and neck pain. Skin:  Negative for rash. Allergic/Immunologic: Negative for environmental allergies. Neurological:  Negative for dizziness and headaches. Psychiatric/Behavioral:  Negative for confusion, decreased concentration and sleep disturbance. PhysicalExam     Vitals:    09/29/22 1508   BP: (!) 143/84   Site: Left Upper Arm   Position: Sitting   Pulse: 63   Temp: 97.4 °F (36.3 °C)   TempSrc: Infrared   Weight: 210 lb (95.3 kg)   Height: 5' 2\" (1.575 m)       Physical Exam  Constitutional:       General: She is not in acute distress. Appearance: She is well-developed. HENT:      Head: Normocephalic and atraumatic. Right Ear: Tympanic membrane, ear canal and external ear normal. No drainage. No middle ear effusion. Tympanic membrane is not bulging. Tympanic membrane has normal mobility. Left Ear: Tympanic membrane, ear canal and external ear normal. No drainage. No middle ear effusion. Tympanic membrane is not bulging. Tympanic membrane has normal mobility. Nose: No mucosal edema or rhinorrhea. Mouth/Throat:      Lips: Pink. Mouth: Mucous membranes are moist.      Tongue: No lesions. Palate: No mass. Pharynx: Uvula midline. Eyes:      Pupils: Pupils are equal, round, and reactive to light. Neck:      Thyroid: No thyroid mass or thyromegaly.       Trachea: Trachea and phonation normal. Cardiovascular:      Pulses: Normal pulses. Pulmonary:      Effort: Pulmonary effort is normal. No accessory muscle usage or respiratory distress. Breath sounds: No stridor. Musculoskeletal:      Cervical back: Full passive range of motion without pain. Lymphadenopathy:      Head:      Right side of head: No submental or submandibular adenopathy. Left side of head: No submental or submandibular adenopathy. Cervical: No cervical adenopathy. Right cervical: No superficial, deep or posterior cervical adenopathy. Left cervical: No superficial, deep or posterior cervical adenopathy. Skin:     General: Skin is warm and dry. Neurological:      Mental Status: She is alert and oriented to person, place, and time. Cranial Nerves: No cranial nerve deficit. Coordination: Coordination normal.      Gait: Gait normal.   Psychiatric:         Thought Content: Thought content normal.         Procedure     Flexible Laryngoscopy    Pre op: globus  Post op: same  Procedure : Flexible Laryngoscopy  Surgeon: Kiana Mccauley DO  Anesthesia: Afrin with 4% lidocaine  Indication: Merel Connor is a 81-year-old female here today with intermittent globus history of ovarian cancer and acid reflux laryngeal mirror examination was not tolerated due to gag reflex  Description:  The scope was passed along the floor of the right naris to the level of the larynx. The base of tongue, vallecula, epiglottis, aryepiglottic folds, arytenoids, false vocal folds, true vocal folds, or pyriform sinuses were all evaluated. True vocal folds exhibited symmetric motion bilaterally without evidence of paralysis or paresis. The scope was removed. The patient tolerated the procedure without difficulty. There were no complications. Pertinent findings: normal examination        Assessment and Plan     1.  Nasal congestion  -Continue Zyrtec and Flonase  - azelastine (ASTELIN) 0.1 % nasal spray; 1 spray by Nasal route 2 times daily Use in each nostril as directed  Dispense: 60 mL; Refill: 3    2. Globus sensation  -Intermittent, provided reassurance no evidence of mass or lesion reflux appears well controlled  -[place] secondary to postnasal drainage    3. Gastroesophageal reflux disease, unspecified whether esophagitis present  -Well-controlled with lifestyle and medication        Monika Abbott DO  9/29/22      Portions of this note were dictated using Dragon.  There may be linguistic errors secondary to the use of this program.

## 2022-10-25 ENCOUNTER — TELEPHONE (OUTPATIENT)
Dept: CARDIOLOGY CLINIC | Age: 47
End: 2022-10-25

## 2022-10-25 NOTE — TELEPHONE ENCOUNTER
Looks as though patient had transthoracic echocardiogram at OSH, could not rule out bicuspid valve disease. PCP requesting JON for assessment of aortic valve. This is appropriate but we would request clinical evaluation first to ensure she is suitable candidate for JON and make the necessary plans.   Happy to see her for evaluation and schedule thereafter if deemed appropriate    REMBERTO

## 2022-10-25 NOTE — TELEPHONE ENCOUNTER
Pt was told by her PCP to call [facility] and schedule JON. Pt was given Central scheduling number by her PCP. Pt called and was redirected to our office. Pt has not been seen by our office. Scheduled pt with CHRISTINEM on 11/29/22 for appt. Is ok, not sure how to handle since this is a procedure not testing and never since by any of our doctors. Please advise.

## 2022-10-25 NOTE — TELEPHONE ENCOUNTER
Is it okay for you to see pt to decide if she needs a JON? Order is under media tab.  She is a marisa pt

## 2022-10-26 ENCOUNTER — TELEPHONE (OUTPATIENT)
Dept: CARDIOLOGY CLINIC | Age: 47
End: 2022-10-26

## 2022-10-26 NOTE — TELEPHONE ENCOUNTER
10/26 Pt called I returning a call. She would like a call back, please.  Pt states to call her at this number [phone]

## 2022-11-16 ENCOUNTER — HOSPITAL ENCOUNTER (OUTPATIENT)
Dept: MRI IMAGING | Age: 47
Discharge: HOME | End: 2022-11-16
Payer: COMMERCIAL

## 2022-11-16 DIAGNOSIS — R92.8 ABNORMAL MRI, BREAST: ICD-10-CM

## 2022-11-16 PROCEDURE — 6360000004 HC RX CONTRAST MEDICATION: Performed by: SURGERY

## 2022-11-16 PROCEDURE — C8908 MRI W/O FOL W/CONT, BREAST,: HCPCS

## 2022-11-16 PROCEDURE — A9579 GAD-BASE MR CONTRAST NOS,1ML: HCPCS | Performed by: SURGERY

## 2022-11-16 RX ADMIN — GADOTERIDOL 19 ML: 279.3 INJECTION, SOLUTION INTRAVENOUS at 09:37

## 2022-11-21 ENCOUNTER — HOSPITAL ENCOUNTER (OUTPATIENT)
Dept: WOMENS IMAGING | Age: 47
Discharge: HOME | End: 2022-11-21
Payer: COMMERCIAL

## 2022-11-21 DIAGNOSIS — Z91.89 AT HIGH RISK FOR BREAST CANCER: ICD-10-CM

## 2022-11-21 DIAGNOSIS — Z12.31 SCREENING MAMMOGRAM FOR HIGH-RISK PATIENT: ICD-10-CM

## 2022-11-21 PROCEDURE — 77067 SCR MAMMO BI INCL CAD: CPT

## 2022-11-29 ENCOUNTER — TELEPHONE (OUTPATIENT)
Dept: CARDIOLOGY CLINIC | Age: 47
End: 2022-11-29

## 2022-11-29 ENCOUNTER — OFFICE VISIT (OUTPATIENT)
Dept: CARDIOLOGY CLINIC | Age: 47
End: 2022-11-29
Payer: COMMERCIAL

## 2022-11-29 VITALS
SYSTOLIC BLOOD PRESSURE: 136 MMHG | DIASTOLIC BLOOD PRESSURE: 88 MMHG | OXYGEN SATURATION: 97 % | BODY MASS INDEX: 40.85 KG/M2 | WEIGHT: 222 LBS | HEIGHT: 62 IN | HEART RATE: 77 BPM

## 2022-11-29 DIAGNOSIS — Z76.89 ESTABLISHING CARE WITH NEW DOCTOR, ENCOUNTER FOR: Primary | ICD-10-CM

## 2022-11-29 DIAGNOSIS — R93.1 ABNORMAL ECHOCARDIOGRAM: Primary | ICD-10-CM

## 2022-11-29 DIAGNOSIS — R93.1 ABNORMAL ECHOCARDIOGRAM: ICD-10-CM

## 2022-11-29 PROCEDURE — G8484 FLU IMMUNIZE NO ADMIN: HCPCS | Performed by: INTERNAL MEDICINE

## 2022-11-29 PROCEDURE — G8417 CALC BMI ABV UP PARAM F/U: HCPCS | Performed by: INTERNAL MEDICINE

## 2022-11-29 PROCEDURE — 1036F TOBACCO NON-USER: CPT | Performed by: INTERNAL MEDICINE

## 2022-11-29 PROCEDURE — G8427 DOCREV CUR MEDS BY ELIG CLIN: HCPCS | Performed by: INTERNAL MEDICINE

## 2022-11-29 PROCEDURE — 93000 ELECTROCARDIOGRAM COMPLETE: CPT | Performed by: INTERNAL MEDICINE

## 2022-11-29 PROCEDURE — 99204 OFFICE O/P NEW MOD 45 MIN: CPT | Performed by: INTERNAL MEDICINE

## 2022-11-29 NOTE — PROGRESS NOTES
CARDIOLOGY CONSULTATION        Patient Name: Anabel Horowitz  Primary Care physician: STEPHANIE Jin CNP    Reason for Referral/Chief Complaint: Anabel Horowitz is a 42 y.o. patient who is referred to cardiology clinic today for evaluation and treatment of possible bicuspid aortic valve. History of Present Illness:   Anabel Horowitz has a past medical history notable for ovarian cancer, hypertension, hyperlipidemia, sleep apnea and thyroid diease. She had a plain exercise stress test at [address] in . Echo at that time showed an EF of 60-65%, Aortic valve probably trileaflet. mild aortic regurgitation. Repeat echocardiogram 2022 demonstrated EF at 57%, bicuspid morphology cannot be excluded    Today, 2022,     The patient denies chest pain, shortness of breath at rest and dyspnea with exertion. Denies palpitations, dizziness, near-syncope or chuck syncope. Denies paroxysmal nocturnal dyspnea, orthopnea, bendopnea, increasing lower extremity edema or weight gain. The patient endorses highest level of activity as ***. Past Medical History:   has a past medical history of Acid reflux, Carcinoma in situ of cervix, Endometriosis, Hyperlipidemia, Hypertension, Ovarian cancer ([facility] Ovarian cancer ([facility] and Thyroid disease. Surgical History:   has a past surgical history that includes  section (); Dental surgery (); Abdomen surgery (11); LEEP; Hysterectomy (2011); Upper gastrointestinal endoscopy (2011); Colonoscopy (10/8/12); Cholecystectomy; Upper gastrointestinal endoscopy; other surgical history (2021); [place] [id number] Surgery (N/A, 2021); Ovary removal; and Partial hysterectomy. Social History:   reports that she quit smoking about 24 years ago. Her smoking use included cigarettes. She has a 0.50 pack-year smoking history. She has never used smokeless tobacco. She reports current alcohol use. She reports that she does not use drugs.      Family History:  family history includes Diabetes in her mother; High Blood Pressure in her father and mother; High Cholesterol in her mother; Kidney Disease in her sister; Ovarian Cancer in her maternal aunt and paternal aunt. Home Medications:  Were reviewed and are listed in nursing record and/or below  Prior to Admission medications    Medication Sig Start Date End Date Taking? Authorizing Provider   azelastine (ASTELIN) 0.1 % nasal spray 1 spray by Nasal route 2 times daily Use in each nostril as directed 9/29/22   Telma Fournier,    lidocaine-prilocaine (EMLA) 2.5-2.5 % cream  10/25/21   Historical Provider, MD   loratadine (CLARITIN) 10 MG tablet Take 10 mg by mouth every evening  Patient not taking: Reported on 9/29/2022    Historical Provider, MD   ELIQUIS 5 MG TABS tablet Take 5 mg by mouth 2 times daily 10/19/21   Historical Provider, MD BLUM THYROID 30 MG tablet TAKE ONE TABLET BY MOUTH DAILY 10/28/21   STEPHANIE Shafer CNP   cetirizine (ZYRTEC) 10 MG tablet Take 10 mg by mouth daily    Historical Provider, MD   buPROPion (WELLBUTRIN XL) 150 MG extended release tablet Take 150 mg by mouth daily 2/12/19   Historical Provider, MD   omeprazole (PRILOSEC) 40 MG delayed release capsule Take 40 mg by mouth daily    Historical Provider, MD        CURRENT Medications:  No current facility-administered medications for this visit. Allergies:  Latex, Sulfa antibiotics, and Other     Review of Systems:   A 14 point review of symptoms completed. Pertinent positives identified in the HPI, all other review of symptoms negative as below. Objective: There were no vitals filed for this visit. PHYSICAL EXAM:    ***  General:  Alert, cooperative, no distress, appears stated age   Head:  Normocephalic, atraumatic   Eyes:  Conjunctiva/corneas clear, anicteric sclerae    Nose: Nares normal, no drainage or sinus tenderness   Throat: No abnormalities of the lips, oral mucosa or tongue.     Neck: Trachea midline. Neck supple with no lymphadenopathy, thyroid not enlarged, symmetric, no tenderness/mass/nodules, no Jugular venous pressure elevation    Lungs:   Clear to auscultation bilaterally, no wheezes, no rales, no respiratory distress   Chest Wall:  No deformity or tenderness to palpation   Heart:  Regular rate and rhythm, normal S1, normal S2, no murmur, no rub, no S3/S4, PMI non-displaced. Abdomen:   Soft, non-tender, with normoactive bowel sounds. No masses, no hepatosplenomegaly   Extremities: No cyanosis, clubbing or pitting edema. Vascular: 2+ radial, brachial, femoral, dorsalis pedis and posterior tibial pulses bilaterally. Brisk carotid upstrokes without carotid bruit. Skin: Skin color, texture, turgor are normal with no rashes or ulceration. Pysch: Euthymic mood, appropriate affect   Neurologic: Oriented to person, place and time. No slurred speech or facial asymmetry. No motor or sensory deficits on gross examination.          Labs:   CBC:   Lab Results   Component Value Date/Time    WBC 8.7 09/23/2021 07:08 PM    RBC 4.41 09/23/2021 07:08 PM    HGB 13.0 09/23/2021 07:08 PM    HCT 39.1 09/23/2021 07:08 PM    MCV 88.7 09/23/2021 07:08 PM    RDW 14.0 09/23/2021 07:08 PM     09/23/2021 07:08 PM     CMP:  Lab Results   Component Value Date/Time     09/23/2021 07:08 PM    K 4.2 09/23/2021 07:08 PM    CL 96 09/23/2021 07:08 PM    CO2 28 09/23/2021 07:08 PM    BUN 15 09/23/2021 07:08 PM    CREATININE 0.8 09/21/2022 09:04 AM    CREATININE 0.8 09/23/2021 07:08 PM    GFRAA >60 09/21/2022 09:04 AM    GFRAA >60 01/02/2013 06:07 PM    AGRATIO 1.5 09/23/2021 07:08 PM    LABGLOM >60 09/21/2022 09:04 AM    GLUCOSE 111 09/23/2021 07:08 PM    PROT 7.6 09/23/2021 07:08 PM    PROT 7.8 01/02/2013 06:07 PM    CALCIUM 9.5 09/23/2021 07:08 PM    BILITOT <0.2 09/23/2021 07:08 PM    ALKPHOS 103 09/23/2021 07:08 PM    AST 21 09/23/2021 07:08 PM    ALT 43 09/23/2021 07:08 PM     PT/INR:  No results found for: PTINR  HgBA1c:  Lab Results   Component Value Date    LABA1C 5.3 2021     Lab Results   Component Value Date    TROPONINI <0.01 2021         Cardiac Data:     EK2021  NSR    Echo: 2022 [facility]   Study Conclusions   - Left ventricle: The cavity size is normal. Wall thickness is     normal. Systolic function was normal. The calculated ejection     fraction was 57%. Wall motion was normal; there were no regional     wall motion abnormalities. Normal diastolic function. The stroke     volume is 81ml. The stroke index is 41ml/m^2. - Aortic valve: A bicuspid morphology cannot be excluded valve was     poorly visualized in the short axis There was trivial     regurgitation. The peak systolic gradient is 9mm Hg. - Inferior vena cava: The vessel was normal in size; the     respirophasic diameter changes were in the normal range (&gt;= 50%);     findings are consistent with normal central venous pressure     Echo: 2014 [facility]   Summary:   The left ventricle is normal in size. There is normal left   ventricular wall thickness. Left ventricular systolic function is   normal. Overall left ventricular ejection fraction is estimated to be   60-65%. Aortic valve probably trileaflet. Mild aortic regurgitation. Stress Test: 2014 [facility]   Interpetation Summary: 1. Negative ECG for ischemia with graded exercise test.    2.Fan Treadmill Score is 10 which indicates low risk. 3.Patient reached 90 % of age predicted maximum heart rate. Procedure: 46833 Graded exercise test without Isotope. Cardiac catheterization:    Additional studies:     CT chest abdomin and Pelvis 2022  Status post hysterectomy. No gross evidence for metastatic disease seen in the chest abdomen and pelvis. Few scattered subcentimeter lung nodules seen with evidence of prior granulomatous disease as above. Recommend close attention on follow-up imaging.    Left-sided chemo port seen in place Slight prominence tissue seen in the anterior mediastinum which may be related to residual thymus. This remains without significant change. This can also be followed up on sequential imaging. Trace inferior pericardial effusion noted. Some degree of hepatic steatosis noted. Asymmetric breast density seen, right greater than left as above. Recommend comparison with clinical and breast exam and follow-up with dedicated mammography. Impression and Plan:      Possible bicuspid aortic valve   Hypertension   Hyperlipidemia   History of ovarian cancer   Thyroid diease   Sleep apnea     Patient Active Problem List   Diagnosis    Hypothyroidism    Fatigue    Dizziness    Class 2 obesity in adult    Elevated random blood glucose level    Obstructive sleep apnea syndrome    Malignant neoplasm of ovary ([facility]         I will address the patient's cardiac risk factors and adjusted pharmacologic treatment as needed. In addition, I have reinforced the need for patient directed risk factor modification. All questions and concerns were addressed to the patient/family. Alternatives to my treatment were discussed. Thank you for allowing us to participate in the care of Genoveva Lynne. Please call me with any questions [phone].     Cory Crouch MD, [facility]  Cardiovascular Disease  [facility]  [phone] [facility]  [phone] [address] [place] [address]  11/29/2022 8:03 AM

## 2022-11-29 NOTE — PATIENT INSTRUCTIONS
PLAN:  JON to further evaluate the heart valves   You will need a   3.     You may take your morning medications with sips of water

## 2022-11-29 NOTE — TELEPHONE ENCOUNTER
Patient seen in office today. Needing a JON scheduled with Dr. Efrain Anglin. Medications reviewed and none need held.

## 2022-11-29 NOTE — LETTER
Janette Izquierdo   1975      Patient was seen in our cardiology office today.           Dr. Ramirez [facility]

## 2022-11-29 NOTE — LETTER
[address] Cardiology - [address] [facility]  Phone: [phone]  Fax: [phone]    Nicky Rojas MD        November 29, 2022    Jazminmorena Claudia  [address] [place] [address]      To Whom it May Concern,      The above patient was seen in our office on 11/30/22. If you have any questions or concerns, please don't hesitate to call.     Sincerely,        Nicky Rojas MD

## 2022-11-29 NOTE — PROGRESS NOTES
CARDIOLOGY CONSULTATION        Patient Name: Dayanna Fairchild  Primary Care physician: Manda Foy, STEPHANIE - CNP    Reason for Referral/Chief Complaint: Dayanna Fairchild is a 42 y.o. patient who is referred to cardiology clinic today for evaluation and treatment of possible bicuspid aortic valve. History of Present Illness:   Dayanna Fairchild has a past medical history notable for ovarian cancer, hypertension, hyperlipidemia, sleep apnea and thyroid diease. She had a plain exercise stress test at [address] in . Echo at that time showed an EF of 60-65%, Aortic valve probably trileaflet. mild aortic regurgitation. Repeat echocardiogram 2022 demonstrated EF at 57%, bicuspid morphology cannot be excluded. Today she reports her PCP referred her. She states she had an Echo and was told she needed a JON. She states the Echo was ordered due to feeling pressure against her neck/upper chest area. Her anxiety makes it worse. She states every now and then she has swallowing issues. She has had an endoscopy and swallow evaluation and those were negative. Laryngoscopy was also negative by her endocrinologist. She has an upcoming CT of the neck scheduled. She states she sleeps with two pillows because it helps the pressure around her neck. The patient denies chest pain with activity. Denies limiting shortness of breath at rest and dyspnea with exertion. Denies palpitations, dizziness, near-syncope or chuck syncope. Denies paroxysmal nocturnal dyspnea, orthopnea, increasing lower extremity edema or weight gain. The patient is compliant with medications. Cost of medications is affordable. No endorsed side effects. Past Medical History:   has a past medical history of Acid reflux, Carcinoma in situ of cervix, Endometriosis, Hyperlipidemia, Hypertension, Ovarian cancer ([address] Ovarian cancer ([facility] [address] and Thyroid disease.     Surgical History:   has a past surgical history that includes  section (2007); Dental surgery (2003); Abdomen surgery (1/20/11); LEEP; Hysterectomy (6/2011); Upper gastrointestinal endoscopy (5/2011); Colonoscopy (10/8/12); Cholecystectomy; Upper gastrointestinal endoscopy; other surgical history (09/13/2021);  [address] Surgery (N/A, 9/13/2021); Ovary removal; and Partial hysterectomy. Social History:   reports that she quit smoking about 24 years ago. Her smoking use included cigarettes. She has a 0.50 pack-year smoking history. She has never used smokeless tobacco. She reports current alcohol use. She reports that she does not use drugs. Family History:  family history includes Diabetes in her mother; High Blood Pressure in her father and mother; High Cholesterol in her mother; Kidney Disease in her sister; Ovarian Cancer in her maternal aunt and paternal aunt. Maternal grandfather passed at 62 with heart disease. 1 biological sister and 2 half sisters with no heart issues. Home Medications:  Were reviewed and are listed in nursing record and/or below  Prior to Admission medications    Medication Sig Start Date End Date Taking?  Authorizing Provider   azelastine (ASTELIN) 0.1 % nasal spray 1 spray by Nasal route 2 times daily Use in each nostril as directed 9/29/22  Yes Carleen Dimas DO   lidocaine-prilocaine (EMLA) 2.5-2.5 % cream  10/25/21  Yes Historical Provider, MD   ELIQUIS 5 MG TABS tablet Take 5 mg by mouth 2 times daily 10/19/21  Yes Historical Provider, MD BLUM THYROID 30 MG tablet TAKE ONE TABLET BY MOUTH DAILY 10/28/21  Yes STEPHANIE Verduzco - CNP   cetirizine (ZYRTEC) 10 MG tablet Take 10 mg by mouth daily   Yes Historical Provider, MD   buPROPion (WELLBUTRIN XL) 150 MG extended release tablet Take 150 mg by mouth daily 2/12/19  Yes Historical Provider, MD   omeprazole (PRILOSEC) 40 MG delayed release capsule Take 40 mg by mouth daily   Yes Historical Provider, MD   loratadine (CLARITIN) 10 MG tablet Take 10 mg by mouth every evening  Patient not 07:08 PM     CMP:  Lab Results   Component Value Date/Time     09/23/2021 07:08 PM    K 4.2 09/23/2021 07:08 PM    CL 96 09/23/2021 07:08 PM    CO2 28 09/23/2021 07:08 PM    BUN 15 09/23/2021 07:08 PM    CREATININE 0.8 09/21/2022 09:04 AM    CREATININE 0.8 09/23/2021 07:08 PM    GFRAA >60 09/21/2022 09:04 AM    GFRAA >60 01/02/2013 06:07 PM    AGRATIO 1.5 09/23/2021 07:08 PM    LABGLOM >60 09/21/2022 09:04 AM    GLUCOSE 111 09/23/2021 07:08 PM    PROT 7.6 09/23/2021 07:08 PM    PROT 7.8 01/02/2013 06:07 PM    CALCIUM 9.5 09/23/2021 07:08 PM    BILITOT <0.2 09/23/2021 07:08 PM    ALKPHOS 103 09/23/2021 07:08 PM    AST 21 09/23/2021 07:08 PM    ALT 43 09/23/2021 07:08 PM     PT/INR:  No results found for: PTINR  HgBA1c:  Lab Results   Component Value Date    LABA1C 5.3 03/12/2021     Lab Results   Component Value Date    TROPONINI <0.01 09/23/2021         Cardiac Data:     EKG personally reviewed today, notable for NSR, nml ECG. EKG 9/23/2021: personally reviewed, notable for:   Normal sinus rhythm. Echo: 9/22/2022 [facility] Conclusions   - Left ventricle: The cavity size is normal. Wall thickness is     normal. Systolic function was normal. The calculated ejection     fraction was 57%. Wall motion was normal; there were no regional     wall motion abnormalities. Normal diastolic function. The stroke     volume is 81ml. The stroke index is 41ml/m^2. - Aortic valve: A bicuspid morphology cannot be excluded valve was     poorly visualized in the short axis There was trivial     regurgitation. The peak systolic gradient is 9mm Hg. - Inferior vena cava: The vessel was normal in size; the     respirophasic diameter changes were in the normal range (&gt;= 50%);     findings are consistent with normal central venous pressure     Echo: 11/2014 [facility]   Summary:   The left ventricle is normal in size. There is normal left   ventricular wall thickness.  Left ventricular systolic function is   normal. Overall left ventricular ejection fraction is estimated to be   60-65%. Aortic valve probably trileaflet. Mild aortic regurgitation. Stress Test: 11/7/2014 [facility]   Interpetation Summary: 1. Negative ECG for ischemia with graded exercise test.    2.Fan Treadmill Score is 10 which indicates low risk. 3.Patient reached 90 % of age predicted maximum heart rate. Procedure: 69055 Graded exercise test without Isotope. CT chest abdomin and Pelvis 9/21/2022  Status post hysterectomy. No gross evidence for metastatic disease seen in the chest abdomen and pelvis. Few scattered subcentimeter lung nodules seen with evidence of prior granulomatous disease as above. Recommend close attention on follow-up imaging. Left-sided chemo port seen in place   Slight prominence tissue seen in the anterior mediastinum which may be related to residual thymus. This remains without significant change. This can also be followed up on sequential imaging. Trace inferior pericardial effusion noted. Some degree of hepatic steatosis noted. Asymmetric breast density seen, right greater than left as above. Recommend comparison with clinical and breast exam and follow-up with dedicated mammography. Impression and Plan:      Possible bicuspid aortic valve   Atypical chest/neck discomfort, low risk   Hypertension   Hyperlipidemia   History of ovarian cancer   Thyroid diease   Sleep apnea     Patient Active Problem List   Diagnosis    Hypothyroidism    Fatigue    Dizziness    Class 2 obesity in adult    Elevated random blood glucose level    Obstructive sleep apnea syndrome    Malignant neoplasm of ovary (HCC)       PLAN:  Agree with moving forward with JON to further evaluate/exclude Bicuspid aortic valve. 2.    Continue all medications as prescribed. Scribe's attestation:   This note was scribed in the presence of Dr. Nicky Rojas MD by Juli Griggs RN    The scribes documentation has been prepared under my direction and personally reviewed by me in its entirety. I confirm that the note above accurately reflects all work, treatment, procedures, and medical decision making performed by me. Ugo Helms MD, personally performed the services described in this documentation as scribed by Gilda Martin RN in my presence, and it is both accurate and complete to the best of our ability. I will address the patient's cardiac risk factors and adjusted pharmacologic treatment as needed. In addition, I have reinforced the need for patient directed risk factor modification. All questions and concerns were addressed to the patient/family. Alternatives to my treatment were discussed. Thank you for allowing us to participate in the care of Sofia Vuong. Please call me with any questions [phone].     Idalmis Perez MD, [facility]  Cardiovascular Disease  [facility]  [phone] [facility]  [phone] [address] [place] [address]  11/29/2022 3:38 PM

## 2022-12-01 NOTE — TELEPHONE ENCOUNTER
Spoke with patient. Patient is scheduled with Dr. Owen Frederick for JON with anesthesia on 12/15/22 at [facility], arrival time of 12:30pm to the Cath Lab. Please have patient arrive to the main entrance of [facility] and check in with the registration desk. Please call patient regarding medication instructions. Remind patient to be NPO after midnight (8 hours prior). Do not apply lotions/creams on skin the day of procedure. COVID testing - NEED ORDER please for patient to obtain within 6 days of procedure.

## 2022-12-08 ENCOUNTER — HOSPITAL ENCOUNTER (EMERGENCY)
Age: 47
Discharge: HOME | End: 2022-12-08
Attending: STUDENT IN AN ORGANIZED HEALTH CARE EDUCATION/TRAINING PROGRAM
Payer: COMMERCIAL

## 2022-12-08 ENCOUNTER — APPOINTMENT (OUTPATIENT)
Dept: GENERAL RADIOLOGY | Age: 47
End: 2022-12-08
Payer: COMMERCIAL

## 2022-12-08 VITALS
WEIGHT: 220 LBS | DIASTOLIC BLOOD PRESSURE: 93 MMHG | RESPIRATION RATE: 16 BRPM | BODY MASS INDEX: 40.24 KG/M2 | OXYGEN SATURATION: 97 % | SYSTOLIC BLOOD PRESSURE: 188 MMHG | TEMPERATURE: 98.6 F | HEART RATE: 80 BPM

## 2022-12-08 DIAGNOSIS — R42 DIZZINESS: Primary | ICD-10-CM

## 2022-12-08 DIAGNOSIS — I10 HYPERTENSION, UNSPECIFIED TYPE: ICD-10-CM

## 2022-12-08 LAB
A/G RATIO: 1.5 (ref 1.1–2.2)
ALBUMIN SERPL-MCNC: 4.4 G/DL (ref 3.4–5)
ALP BLD-CCNC: 143 U/L (ref 40–129)
ALT SERPL-CCNC: 41 U/L (ref 10–40)
ANION GAP SERPL CALCULATED.3IONS-SCNC: 11 MMOL/L (ref 3–16)
AST SERPL-CCNC: 27 U/L (ref 15–37)
BASOPHILS ABSOLUTE: 0 K/UL (ref 0–0.2)
BASOPHILS RELATIVE PERCENT: 0.4 %
BILIRUB SERPL-MCNC: <0.2 MG/DL (ref 0–1)
BUN BLDV-MCNC: 11 MG/DL (ref 7–20)
CALCIUM SERPL-MCNC: 9 MG/DL (ref 8.3–10.6)
CHLORIDE BLD-SCNC: 102 MMOL/L (ref 99–110)
CO2: 26 MMOL/L (ref 21–32)
CREAT SERPL-MCNC: 0.7 MG/DL (ref 0.6–1.1)
EKG ATRIAL RATE: 67 BPM
EKG DIAGNOSIS: NORMAL
EKG P AXIS: 48 DEGREES
EKG P-R INTERVAL: 140 MS
EKG Q-T INTERVAL: 430 MS
EKG QRS DURATION: 108 MS
EKG QTC CALCULATION (BAZETT): 454 MS
EKG R AXIS: 66 DEGREES
EKG T AXIS: 44 DEGREES
EKG VENTRICULAR RATE: 67 BPM
EOSINOPHILS ABSOLUTE: 0.3 K/UL (ref 0–0.6)
EOSINOPHILS RELATIVE PERCENT: 3.9 %
GFR SERPL CREATININE-BSD FRML MDRD: >60 ML/MIN/{1.73_M2}
GLUCOSE BLD-MCNC: 133 MG/DL (ref 70–99)
HCT VFR BLD CALC: 40.1 % (ref 36–48)
HEMOGLOBIN: 13.2 G/DL (ref 12–16)
LYMPHOCYTES ABSOLUTE: 3 K/UL (ref 1–5.1)
LYMPHOCYTES RELATIVE PERCENT: 38.4 %
MAGNESIUM: 2.1 MG/DL (ref 1.8–2.4)
MCH RBC QN AUTO: 28 PG (ref 26–34)
MCHC RBC AUTO-ENTMCNC: 33 G/DL (ref 31–36)
MCV RBC AUTO: 84.7 FL (ref 80–100)
MONOCYTES ABSOLUTE: 0.5 K/UL (ref 0–1.3)
MONOCYTES RELATIVE PERCENT: 6.2 %
NEUTROPHILS ABSOLUTE: 4 K/UL (ref 1.7–7.7)
NEUTROPHILS RELATIVE PERCENT: 51.1 %
PDW BLD-RTO: 15.9 % (ref 12.4–15.4)
PLATELET # BLD: 314 K/UL (ref 135–450)
PMV BLD AUTO: 8.3 FL (ref 5–10.5)
POTASSIUM REFLEX MAGNESIUM: 3.5 MMOL/L (ref 3.5–5.1)
RBC # BLD: 4.73 M/UL (ref 4–5.2)
SODIUM BLD-SCNC: 139 MMOL/L (ref 136–145)
TOTAL PROTEIN: 7.4 G/DL (ref 6.4–8.2)
TROPONIN: <0.01 NG/ML
WBC # BLD: 7.8 K/UL (ref 4–11)

## 2022-12-08 PROCEDURE — 93010 ELECTROCARDIOGRAM REPORT: CPT | Performed by: INTERNAL MEDICINE

## 2022-12-08 PROCEDURE — 99285 EMERGENCY DEPT VISIT HI MDM: CPT

## 2022-12-08 PROCEDURE — 80053 COMPREHEN METABOLIC PANEL: CPT

## 2022-12-08 PROCEDURE — 71045 X-RAY EXAM CHEST 1 VIEW: CPT

## 2022-12-08 PROCEDURE — 85025 COMPLETE CBC W/AUTO DIFF WBC: CPT

## 2022-12-08 PROCEDURE — 93005 ELECTROCARDIOGRAM TRACING: CPT | Performed by: STUDENT IN AN ORGANIZED HEALTH CARE EDUCATION/TRAINING PROGRAM

## 2022-12-08 PROCEDURE — 83735 ASSAY OF MAGNESIUM: CPT

## 2022-12-08 PROCEDURE — 84484 ASSAY OF TROPONIN QUANT: CPT

## 2022-12-08 ASSESSMENT — PAIN - FUNCTIONAL ASSESSMENT: PAIN_FUNCTIONAL_ASSESSMENT: NONE - DENIES PAIN

## 2022-12-08 NOTE — DISCHARGE INSTRUCTIONS
Return the nearest ED if you develop severe headaches, nausea and vomiting, chest pain, shortness of breath, other concerning symptoms. Your alkaline phosphatase (a liver lab) was elevated today, you should follow-up on this with your primary care provider.

## 2022-12-08 NOTE — ED PROVIDER NOTES
[address] ED      CHIEF COMPLAINT  Hypertension (Pt reports she \"felt like her BP was high\" aprx 1530 today, had some dizziness, is not prescribed meds for HTN, takes eliquis, hx of clot after port placement so pt was worried and came in )       HISTORY OF PRESENT ILLNESS  Dayanna Fairchild is a 42 y.o. female  who presents to the ED complaining of lightheadedness that occurred approximately 1 hour prior to arrival.  Indicates that she had a slight pressure in her head and took some Sudafed thinking this was a sinus headache. Shortly thereafter is when she developed the lightheaded sensation. She checked her blood pressure and noticed it was 160s over 90s and was concerned this may be indicative of a serious pathology. Does endorse some mild nausea, no vomiting. Denies any recent fevers or chills. No other complaints, modifying factors or associated symptoms. I have reviewed the following from the nursing documentation.     Past Medical History:   Diagnosis Date    Acid reflux     Carcinoma in situ of cervix     Endometriosis     Hyperlipidemia     Hypertension     Ovar[facility] ([facility] [address]     Ovarian cancer ([facility] [address] 2021    Thyroid disease     hypothyroidism     Past Surgical History:   Procedure Laterality Date    ABDOMEN SURGERY  11    laparoscopic cholecystectomy     SECTION  2007    CHOLECYSTECTOMY      COLONOSCOPY  10/8/12    normal    DENTAL SURGERY  2003    EXTRACTION OF WISDOM TEETH    HYSTERECTOMY (CERVIX STATUS UNKNOWN)  2011    LEEP      OTHER SURGICAL HISTORY  2021    PORT INSERTION    OVARY REMOVAL      PARTIAL HYSTERECTOMY (CERVIX NOT REMOVED)      PORT SURGERY N/A 2021    PORT INSERTION performed by Wilian Cyr MD at [address] [place] [address]  2011    UPPER GASTROINTESTINAL ENDOSCOPY      bravo     Family History   Problem Relation Age of Onset    Diabetes Mother     High Blood Pressure Mother     High Cholesterol Mother High Blood Pressure Father     Kidney Disease Sister     Ovarian Cancer Maternal Aunt     Ovarian Cancer Paternal Aunt      Social History     Socioeconomic History    Marital status:      Spouse name: Not on file    Number of children: Not on file    Years of education: Not on file    Highest education level: Not on file   Occupational History    Not on file   Tobacco Use    Smoking status: Former     Packs/day: 0.25     Years: 2.00     Pack years: 0.50     Types: Cigarettes     Quit date: 1998     Years since quittin.9    Smokeless tobacco: Never   Vaping Use    Vaping Use: Never used   Substance and Sexual Activity    Alcohol use: Yes     Comment: 2 drinks per year    Drug use: No    Sexual activity: Yes   Other Topics Concern    Not on file   Social History Narrative    Not on file     Social Determinants of Health     Financial Resource Strain: Not on file   Food Insecurity: Not on file   Transportation Needs: Not on file   Physical Activity: Not on file   Stress: Not on file   Social Connections: Not on file   Intimate Partner Violence: Not on file   Housing Stability: Not on file     No current facility-administered medications for this encounter.      Current Outpatient Medications   Medication Sig Dispense Refill    azelastine (ASTELIN) 0.1 % nasal spray 1 spray by Nasal route 2 times daily Use in each nostril as directed 60 mL 3    lidocaine-prilocaine (EMLA) 2.5-2.5 % cream       loratadine (CLARITIN) 10 MG tablet Take 10 mg by mouth every evening (Patient not taking: No sig reported)      ELIQUIS 5 MG TABS tablet Take 5 mg by mouth 2 times daily      ARMOUR THYROID 30 MG tablet TAKE ONE TABLET BY MOUTH DAILY 30 tablet 5    cetirizine (ZYRTEC) 10 MG tablet Take 10 mg by mouth daily      buPROPion (WELLBUTRIN XL) 150 MG extended release tablet Take 150 mg by mouth daily      omeprazole (PRILOSEC) 40 MG delayed release capsule Take 40 mg by mouth daily       Allergies   Allergen Reactions Latex Rash     REDNESS OF HANDS WITH RASH    Sulfa Antibiotics Rash    Other Itching     Kiwi - Itchy throat and mouth  Pinapplie - itchy throat and mouth         REVIEW OF SYSTEMS  10 systems reviewed, pertinent positives per HPI otherwise noted to be negative. PHYSICAL EXAM  BP (!) 188/93   Pulse 80   Temp 98.6 °F (37 °C) (Oral)   Resp 16   Wt 220 lb (99.8 kg)   LMP 04/24/2011   SpO2 97%   BMI 40.24 kg/m²    General: Appears well. Alert  HEENT: Head atraumatic, Eyes normal inspection, PERRL. Normal ENT inspection, Pharynx normal. No signs of dehydration  NECK: Normal inspection  RESPIRATORY: Normal breath sounds. Mild anterior chest wall tenderness. No respiratory distress  CVS: Heart rate and rhythm regular. No Murmurs  ABDOMEN/GI: Soft, Non-tender, No distention  BACK: Normal inspection  EXTREMITIES: Non-Tender. Full ROM. Normal appearance. No Pedal edema  NEURO: Alert and oriented. Sensation normal. Motor normal  PSYCH: Mood normal. Affect normal.  SKIN: Color normal. No rash. Warm, Dry    LABS  I have reviewed all labs for this visit.    Results for orders placed or performed during the hospital encounter of 12/08/22   CBC with Auto Differential   Result Value Ref Range    WBC 7.8 4.0 - 11.0 K/uL    RBC 4.73 4.00 - 5.20 M/uL    Hemoglobin 13.2 12.0 - 16.0 g/dL    Hematocrit 40.1 36.0 - 48.0 %    MCV 84.7 80.0 - 100.0 fL    MCH 28.0 26.0 - 34.0 pg    MCHC 33.0 31.0 - 36.0 g/dL    RDW 15.9 (H) 12.4 - 15.4 %    Platelets 190 448 - 987 K/uL    MPV 8.3 5.0 - 10.5 fL    Neutrophils % 51.1 %    Lymphocytes % 38.4 %    Monocytes % 6.2 %    Eosinophils % 3.9 %    Basophils % 0.4 %    Neutrophils Absolute 4.0 1.7 - 7.7 K/uL    Lymphocytes Absolute 3.0 1.0 - 5.1 K/uL    Monocytes Absolute 0.5 0.0 - 1.3 K/uL    Eosinophils Absolute 0.3 0.0 - 0.6 K/uL    Basophils Absolute 0.0 0.0 - 0.2 K/uL   CMP w/ Reflex to MG   Result Value Ref Range    Sodium 139 136 - 145 mmol/L    Potassium reflex Magnesium 3.5 3.5 - 5.1 mmol/L    Chloride 102 99 - 110 mmol/L    CO2 26 21 - 32 mmol/L    Anion Gap 11 3 - 16    Glucose 133 (H) 70 - 99 mg/dL    BUN 11 7 - 20 mg/dL    Creatinine 0.7 0.6 - 1.1 mg/dL    Est, Glom Filt Rate >60 >60    Calcium 9.0 8.3 - 10.6 mg/dL    Total Protein 7.4 6.4 - 8.2 g/dL    Albumin 4.4 3.4 - 5.0 g/dL    Albumin/Globulin Ratio 1.5 1.1 - 2.2    Total Bilirubin <0.2 0.0 - 1.0 mg/dL    Alkaline Phosphatase 143 (H) 40 - 129 U/L    ALT 41 (H) 10 - 40 U/L    AST 27 15 - 37 U/L   Troponin   Result Value Ref Range    Troponin <0.01 <0.01 ng/mL   Magnesium   Result Value Ref Range    Magnesium 2.10 1.80 - 2.40 mg/dL   EKG 12 Lead   Result Value Ref Range    Ventricular Rate 67 BPM    Atrial Rate 67 BPM    P-R Interval 140 ms    QRS Duration 108 ms    Q-T Interval 430 ms    QTc Calculation (Bazett) 454 ms    P Axis 48 degrees    R Axis 66 degrees    T Axis 44 degrees    Diagnosis       Normal sinus rhythm with sinus arrhythmiaNormal ECGWhen compared with ECG of 23-SEP-2021 20:48,No significant change was found       ECG  The Ekg interpreted by me shows  Sinus rhythm with sinus arrhythmia and a rate of 67 bpm.  Normal axis. No acute injury pattern. , , QTc 454. No significant change from prior EKG dated 9/23/2021    RADIOLOGY  XR CHEST PORTABLE   Final Result   No radiographic evidence of an acute cardiopulmonary process. ED COURSE/MDM  Patient seen and evaluated. Old records reviewed. Labs and imaging reviewed and results discussed with patient. Lab work obtained and is significant for mildly elevated alkaline phosphatase and ALT. Lab work is otherwise reassuring. Chest x-ray shows no acute process. EKG is reassuring with a normal rhythm and no acute injury pattern. On reevaluation patient's systolic blood pressure decreased mildly into the 170s. She continues to have a mild headache. I discussed in detail the results and findings of our evaluation today.   I did stress the importance of following up on the alkaline phosphatase, as it is only mildly elevated, this seems less concerning, but considering her history of cancer, I do believe this needs to be rechecked in the near future with her oncologist.  Patient already has an appointment in 3 weeks for repeat lab work and follow-up with oncology, which I believe is a reasonable timeframe to follow-up on this lab abnormality. Patient given return precautions for severe headache, nausea vomiting, changes in vision, other concerning symptoms. Follow-up to PCP in 2 to 3 days. Follow-up to the oncology as previously planned. Is this patient to be included in the SEP-1 Core Measure due to severe sepsis or septic shock? No   Exclusion criteria - the patient is NOT to be included for SEP-1 Core Measure due to: Infection is not suspected    During the patient's ED course, the patient was given:  Medications - No data to display     I, Donny Hawk DO,  am the primary clinician of record. CLINICAL IMPRESSION  1. Dizziness    2. Hypertension, unspecified type        Blood pressure (!) 188/93, pulse 80, temperature 98.6 °F (37 °C), temperature source Oral, resp. rate 16, weight 220 lb (99.8 kg), last menstrual period 04/24/2011, SpO2 97 %, not currently breastfeeding. Quinton Hankins was discharged to home in stable condition. Patient was given scripts for the following medications. I counseled patient how to take these medications. Discharge Medication List as of 12/8/2022  5:21 PM          Follow-up with:  STEPHANIE Nelson - CNP  [address]  [phone]    Call in 3 days    DISCLAIMER: This chart was created using Dragon dictation software. Efforts were made by me to ensure accuracy, however some errors may be present due to limitations of this technology and occasionally words are not transcribed correctly.         Donny Hawk DO  12/08/22 1573

## 2022-12-08 NOTE — Clinical Note
Nas Lipscomb was seen and treated in our emergency department on 12/8/2022. She may return to work on 12/09/2022. If you have any questions or concerns, please don't hesitate to call.       Nicola Garcia, DO

## 2022-12-15 ENCOUNTER — HOSPITAL ENCOUNTER (OUTPATIENT)
Dept: CARDIAC CATH/INVASIVE PROCEDURES | Age: 47
Discharge: HOME | End: 2022-12-15
Attending: INTERNAL MEDICINE | Admitting: INTERNAL MEDICINE
Payer: COMMERCIAL

## 2022-12-15 ENCOUNTER — ANESTHESIA EVENT (OUTPATIENT)
Dept: CARDIAC CATH/INVASIVE PROCEDURES | Age: 47
End: 2022-12-15
Payer: COMMERCIAL

## 2022-12-15 ENCOUNTER — ANESTHESIA (OUTPATIENT)
Dept: CARDIAC CATH/INVASIVE PROCEDURES | Age: 47
End: 2022-12-15
Payer: COMMERCIAL

## 2022-12-15 ENCOUNTER — HOSPITAL ENCOUNTER (OUTPATIENT)
Dept: NON INVASIVE DIAGNOSTICS | Age: 47
Discharge: HOME | End: 2022-12-15
Attending: INTERNAL MEDICINE
Payer: COMMERCIAL

## 2022-12-15 LAB
ANION GAP SERPL CALCULATED.3IONS-SCNC: 11 MMOL/L (ref 3–16)
BUN BLDV-MCNC: 11 MG/DL (ref 7–20)
CALCIUM SERPL-MCNC: 9.8 MG/DL (ref 8.3–10.6)
CHLORIDE BLD-SCNC: 101 MMOL/L (ref 99–110)
CO2: 29 MMOL/L (ref 21–32)
CREAT SERPL-MCNC: 0.7 MG/DL (ref 0.6–1.1)
GFR SERPL CREATININE-BSD FRML MDRD: >60 ML/MIN/{1.73_M2}
GLUCOSE BLD-MCNC: 83 MG/DL (ref 70–99)
HCT VFR BLD CALC: 39.6 % (ref 36–48)
HEMOGLOBIN: 13.2 G/DL (ref 12–16)
INR BLD: 1.12 (ref 0.87–1.14)
MCH RBC QN AUTO: 28.4 PG (ref 26–34)
MCHC RBC AUTO-ENTMCNC: 33.3 G/DL (ref 31–36)
MCV RBC AUTO: 85.2 FL (ref 80–100)
PDW BLD-RTO: 16 % (ref 12.4–15.4)
PLATELET # BLD: 341 K/UL (ref 135–450)
PMV BLD AUTO: 8 FL (ref 5–10.5)
POTASSIUM REFLEX MAGNESIUM: 4.3 MMOL/L (ref 3.5–5.1)
PROTHROMBIN TIME: 14.4 SEC (ref 11.7–14.5)
RBC # BLD: 4.65 M/UL (ref 4–5.2)
SODIUM BLD-SCNC: 141 MMOL/L (ref 136–145)
WBC # BLD: 8.6 K/UL (ref 4–11)

## 2022-12-15 PROCEDURE — 93005 ELECTROCARDIOGRAM TRACING: CPT | Performed by: INTERNAL MEDICINE

## 2022-12-15 PROCEDURE — 3700000001 HC ADD 15 MINUTES (ANESTHESIA)

## 2022-12-15 PROCEDURE — 80048 BASIC METABOLIC PNL TOTAL CA: CPT

## 2022-12-15 PROCEDURE — 3700000000 HC ANESTHESIA ATTENDED CARE

## 2022-12-15 PROCEDURE — 93312 ECHO TRANSESOPHAGEAL: CPT

## 2022-12-15 PROCEDURE — 93325 DOPPLER ECHO COLOR FLOW MAPG: CPT | Performed by: INTERNAL MEDICINE

## 2022-12-15 PROCEDURE — 85610 PROTHROMBIN TIME: CPT

## 2022-12-15 PROCEDURE — 6360000002 HC RX W HCPCS: Performed by: NURSE ANESTHETIST, CERTIFIED REGISTERED

## 2022-12-15 PROCEDURE — 93325 DOPPLER ECHO COLOR FLOW MAPG: CPT

## 2022-12-15 PROCEDURE — 93320 DOPPLER ECHO COMPLETE: CPT

## 2022-12-15 PROCEDURE — 85027 COMPLETE CBC AUTOMATED: CPT

## 2022-12-15 PROCEDURE — 93312 ECHO TRANSESOPHAGEAL: CPT | Performed by: INTERNAL MEDICINE

## 2022-12-15 RX ORDER — SODIUM CHLORIDE 9 MG/ML
INJECTION, SOLUTION INTRAVENOUS PRN
Status: DISCONTINUED | OUTPATIENT
Start: 2022-12-15 | End: 2022-12-15 | Stop reason: HOSPADM

## 2022-12-15 RX ORDER — SODIUM CHLORIDE 0.9 % (FLUSH) 0.9 %
5-40 SYRINGE (ML) INJECTION PRN
Status: DISCONTINUED | OUTPATIENT
Start: 2022-12-15 | End: 2022-12-15 | Stop reason: HOSPADM

## 2022-12-15 RX ORDER — PROPOFOL 10 MG/ML
INJECTION, EMULSION INTRAVENOUS PRN
Status: DISCONTINUED | OUTPATIENT
Start: 2022-12-15 | End: 2022-12-15 | Stop reason: SDUPTHER

## 2022-12-15 RX ORDER — SODIUM CHLORIDE 0.9 % (FLUSH) 0.9 %
5-40 SYRINGE (ML) INJECTION EVERY 12 HOURS SCHEDULED
Status: DISCONTINUED | OUTPATIENT
Start: 2022-12-15 | End: 2022-12-15 | Stop reason: HOSPADM

## 2022-12-15 RX ADMIN — SODIUM CHLORIDE: 9 INJECTION, SOLUTION INTRAVENOUS at 14:12

## 2022-12-15 RX ADMIN — PROPOFOL 200 MG: 10 INJECTION, EMULSION INTRAVENOUS at 14:14

## 2022-12-15 NOTE — H&P
Brief Pre-Op Note/Sedation Assessment      [facility]  1975  Cath Pool Rm/NONE      [id number]  1:54 PM    Planned Procedure: JON    Post Procedure Plan: Return to same level of care    Consent: I have discussed with the patient and/or the patient representative the indication, alternatives, and the possible risks and/or complications of the planned procedure and the anesthesia methods. The patient and/or patient representative appear to understand and agree to proceed. Chief Complaint: Possible BAV    41year old with prior medical history notable for hypertension, Obesity, Obstructive sleep apnea, possible BAV by TTE, presenting today for JON for further evaluation. Vital Signs:  LMP 2011     Allergies:   Allergies   Allergen Reactions    Latex Rash     REDNESS OF HANDS WITH RASH    Sulfa Antibiotics Rash    Other Itching     Kiwi - Itchy throat and mouth  Pinapplie - itchy throat and mouth         Past Medical History:  Past Medical History:   Diagnosis Date    Acid reflux     Carcinoma in situ of cervix     Endometriosis     Hyperlipidemia     Hypertension     Ovarian cancer ([facility] [address]     Ovarian cancer ([facility] [address] 2021    Thyroid disease     hypothyroidism         Surgical History:  Past Surgical History:   Procedure Laterality Date    ABDOMEN SURGERY  11    laparoscopic cholecystectomy     SECTION  2007    CHOLECYSTECTOMY      COLONOSCOPY  10/8/12    normal    DENTAL SURGERY  2003    EXTRACTION OF WISDOM TEETH    HYSTERECTOMY (CERVIX STATUS UNKNOWN)  2011    LEEP      OTHER SURGICAL HISTORY  2021    PORT INSERTION    OVARY REMOVAL      PARTIAL HYSTERECTOMY (CERVIX NOT REMOVED)      PORT SURGERY N/A 2021    PORT INSERTION performed by Carina Hernandez MD at [address]  2011    UPPER GASTROINTESTINAL ENDOSCOPY      bravo         Medications:  Current Facility-Administered Medications   Medication Dose Route Frequency Provider Last Rate Last Admin    sodium chloride flush 0.9 % injection 5-40 mL  5-40 mL IntraVENous 2 times per day Rey Campos MD        sodium chloride flush 0.9 % injection 5-40 mL  5-40 mL IntraVENous PRN Rey Campos MD        0.9 % sodium chloride infusion   IntraVENous PRN Rey Campos MD               Pre-Sedation:    Pre-Sedation Documentation and Exam:  I have personally completed a history, physical exam & review of systems for this patient (see notes). Prior History of Anesthesia Complications:   none    Modified Mallampati:  II (soft palate, uvula, fauces visible)    ASA Classification:  Class 2 - A normal healthy patient with mild systemic disease      Phong Scale: Activity:  2 - Able to move 4 extremities voluntarily on command  Respiration:  2 - Able to breathe deeply and cough freely  Circulation:  2 - BP+/- 20mmHg of normal  Consciousness:  2 - Fully awake  Oxygen Saturation (color):  2 - Able to maintain oxygen saturation >92% on room air    Sedation/Anesthesia Plan:  Guard the patient's safety and welfare. Minimize physical discomfort and pain. Minimize negative psychological responses to treatment by providing sedation and analgesia and maximize the potential amnesia. Patient to meet pre-procedure discharge plan. Medication Planned:  Per anesthesia.     Patient is an appropriate candidate for plan of sedation: yes      Electronically signed by Rey Campos MD on 12/15/2022 at 1:54 PM

## 2022-12-15 NOTE — ANESTHESIA PRE PROCEDURE
Department of Anesthesiology  Preprocedure Note       Name:  Yadi Corrigan   Age:  41 y.o.  :  1975                                          MRN:  [id number]         Date:  12/15/2022      Surgeon: * Surgery not found *    Procedure:     Medications prior to admission:   Prior to Admission medications    Medication Sig Start Date End Date Taking? Authorizing Provider   azelastine (ASTELIN) 0.1 % nasal spray 1 spray by Nasal route 2 times daily Use in each nostril as directed 22   Telma Fournier DO   lidocaine-prilocaine (EMLA) 2.5-2.5 % cream  10/25/21   Historical Provider, MD   ELIQUIS 5 MG TABS tablet Take 5 mg by mouth 2 times daily 10/19/21   Historical Provider, MD BLUM THYROID 30 MG tablet TAKE ONE TABLET BY MOUTH DAILY 10/28/21   STEPHANIE Shafer - CNP   cetirizine (ZYRTEC) 10 MG tablet Take 10 mg by mouth daily    Historical Provider, MD   buPROPion (WELLBUTRIN XL) 150 MG extended release tablet Take 150 mg by mouth daily 19   Historical Provider, MD   omeprazole (PRILOSEC) 40 MG delayed release capsule Take 40 mg by mouth daily    Historical Provider, MD       Current medications:    Current Facility-Administered Medications   Medication Dose Route Frequency Provider Last Rate Last Admin    sodium chloride flush 0.9 % injection 5-40 mL  5-40 mL IntraVENous 2 times per day Isma Singh MD        sodium chloride flush 0.9 % injection 5-40 mL  5-40 mL IntraVENous PRN Isma Singh MD        0.9 % sodium chloride infusion   IntraVENous PRN Isma Singh MD           Allergies:     Allergies   Allergen Reactions    Latex Rash     REDNESS OF HANDS WITH RASH    Sulfa Antibiotics Rash    Other Itching     Kiwi - Itchy throat and mouth  Pinapplie - itchy throat and mouth         Problem List:    Patient Active Problem List   Diagnosis Code    Hypothyroidism E03.9    Fatigue R53.83    Dizziness R42    Class 2 obesity in adult E66.9    Elevated random blood glucose level R73.09    Obstructive sleep apnea syndrome G47.33    Malignant neoplasm of ovary (HCC) C56.9    Abnormal echocardiogram R93.1       Past Medical History:        Diagnosis Date    Acid reflux     Carcinoma in situ of cervix     Endometriosis     Hyperlipidemia     Hypertension     [facility] [address]     Ovarian cancer ([facility] [address] 2021    Thyroid disease     hypothyroidism       Past Surgical History:        Procedure Laterality Date    ABDOMEN SURGERY  11    laparoscopic cholecystectomy     SECTION      CHOLECYSTECTOMY      COLONOSCOPY  10/8/12    normal    DENTAL SURGERY  2003    EXTRACTION OF WISDOM TEETH    HYSTERECTOMY (CERVIX STATUS UNKNOWN)  2011    LEEP      OTHER SURGICAL HISTORY  2021    PORT INSERTION    OVARY REMOVAL      PARTIAL HYSTERECTOMY (CERVIX NOT REMOVED)      PORT SURGERY N/A 2021    PORT INSERTION performed by Jennifer Herrera MD at [address] [place] [address]  2011    UPPER GASTROINTESTINAL ENDOSCOPY      bravo       Social History:    Social History     Tobacco Use    Smoking status: Former     Packs/day: 0.25     Years: 2.00     Pack years: 0.50     Types: Cigarettes     Quit date: 1998     Years since quittin.9    Smokeless tobacco: Never   Substance Use Topics    Alcohol use: Yes     Comment: 2 drinks per year                                Counseling given: Not Answered      Vital Signs (Current): There were no vitals filed for this visit.                                            BP Readings from Last 3 Encounters:   22 (!) 188/93   22 136/88   22 (!) 143/84       NPO Status:                                                                                 BMI:   Wt Readings from Last 3 Encounters:   22 220 lb (99.8 kg)   22 222 lb (100.7 kg)   22 210 lb (95.3 kg)     There is no height or weight on file to calculate BMI.    CBC:   Lab Results   Component Value Date/Time    WBC 8.6 12/15/2022 01:13 PM    RBC 4.65 12/15/2022 01:13 PM    HGB 13.2 12/15/2022 01:13 PM    HCT 39.6 12/15/2022 01:13 PM    MCV 85.2 12/15/2022 01:13 PM    RDW 16.0 12/15/2022 01:13 PM     12/15/2022 01:13 PM       CMP:   Lab Results   Component Value Date/Time     12/08/2022 04:32 PM    K 3.5 12/08/2022 04:32 PM     12/08/2022 04:32 PM    CO2 26 12/08/2022 04:32 PM    BUN 11 12/08/2022 04:32 PM    CREATININE 0.7 12/08/2022 04:32 PM    GFRAA >60 09/21/2022 09:04 AM    GFRAA >60 01/02/2013 06:07 PM    AGRATIO 1.5 12/08/2022 04:32 PM    LABGLOM >60 12/08/2022 04:32 PM    GLUCOSE 133 12/08/2022 04:32 PM    PROT 7.4 12/08/2022 04:32 PM    PROT 7.8 01/02/2013 06:07 PM    CALCIUM 9.0 12/08/2022 04:32 PM    BILITOT <0.2 12/08/2022 04:32 PM    ALKPHOS 143 12/08/2022 04:32 PM    AST 27 12/08/2022 04:32 PM    ALT 41 12/08/2022 04:32 PM       POC Tests: No results for input(s): POCGLU, POCNA, POCK, POCCL, POCBUN, POCHEMO, POCHCT in the last 72 hours.     Coags:   Lab Results   Component Value Date/Time    PROTIME 14.4 12/15/2022 01:13 PM    INR 1.12 12/15/2022 01:13 PM    APTT 33.8 01/02/2013 06:07 PM       HCG (If Applicable):   Lab Results   Component Value Date    PREGTESTUR Negative 02/02/2015        ABGs: No results found for: PHART, PO2ART, KSH6KRF, CKV1NDL, BEART, I4MRNDXW     Type & Screen (If Applicable):  No results found for: LABABO, LABRH    Drug/Infectious Status (If Applicable):  No results found for: HIV, HEPCAB    COVID-19 Screening (If Applicable):   Lab Results   Component Value Date/Time    COVID19 Not Detected 09/09/2021 04:07 PM           Anesthesia Evaluation  Patient summary reviewed and Nursing notes reviewed no history of anesthetic complications:   Airway: Mallampati: II  TM distance: >3 FB   Neck ROM: full  Mouth opening: > = 3 FB   Dental: normal exam         Pulmonary:   (+) sleep apnea:                             Cardiovascular:    (+) hypertension:, Neuro/Psych:   Negative Neuro/Psych ROS              GI/Hepatic/Renal: Neg GI/Hepatic/Renal ROS  (+) GERD:,      (-) liver disease and no renal disease       Endo/Other:    (+) hypothyroidism::., malignancy/cancer (ovarian). (-) diabetes mellitus               Abdominal:             Vascular: negative vascular ROS. Other Findings:           Anesthesia Plan      MAC     ASA 3       Induction: intravenous. Anesthetic plan and risks discussed with patient. Plan discussed with CRNA.                     James Bennett MD   12/15/2022

## 2022-12-15 NOTE — ANESTHESIA POSTPROCEDURE EVALUATION
Department of Anesthesiology  Postprocedure Note    Patient: Caitlin Phan  MRN: [id number]  YOB: 1975  Date of evaluation: 12/15/2022      Procedure Summary     Date: 12/15/22 [facility]    Anesthesia Start: 5082 Anesthesia Stop: 6472    Procedure: TRANSESOPHAGEAL ECHO Diagnosis:       Abnormal findings on diagnostic imaging of heart and coronary circulation      Abnormal findings on diagnostic imaging of heart and coronary circulation    Scheduled Providers:  Responsible Provider: Fallon Spring MD    Anesthesia Type: MAC ASA Status: 3          Anesthesia Type: No value filed.     Phong Phase I:      Phong Phase II:        Anesthesia Post Evaluation    Patient location during evaluation: bedside  Level of consciousness: awake  Airway patency: patent  Nausea & Vomiting: no nausea  Complications: no  Cardiovascular status: blood pressure returned to baseline  Respiratory status: acceptable  Hydration status: euvolemic

## 2022-12-15 NOTE — DISCHARGE INSTRUCTIONS
Cath Labs at  [facility]                        12/15/2022  Janette Izquierdo   Date of Birth 1975     TRANSESOPHAGEAL ECHOCARDIOGRAM DISCHARGE INSTRUCTIONS      You have been given sedation for your procedure so you may not drive, operate any machinery, or sign any legal documentation for 24 hours. Do not drink or eat for at least 2 hours AND until your gag reflex returns. Check by touching the back of the tongue to be sure you can gag. Do not drink any alcohol today. Eat soft foods at first then gradually return to your normal diet. CALL YOUR DOCTOR If you should develop a fever of 101, cough up teaspoon amounts of blood, develop severe shortness of breath or chest pain. If symptoms are severe, go to the emergency room. Contact your doctor for a follow-up visit and/or results of your procedure. SEDATION DISCHARGE INSTRUCTION:  For the next 24 hours do not drive a car, operate machinery, power tools or kitchen appliances. Do not drink alcohol; including beer or wine. Do not make any important decisions or sign any important papers. For the next 24 hours you can expect drowsiness, light-headed or dizziness, nausea/ vomiting, inability to concentrate, fatigue and desire to sleep. We strongly suggest that a responsible adult be with for the next 24 hours, for your protection and safety. You are not allowed to drive yourself home, or take any type of public transportation. If any questions, please call your doctor. If you cannot reach your Doctor then call the Emergency Department at  [phone]. Explain to the Emergency Department the procedure you had performed and they will be able to assist you. If you seek Emergency Care, bring this form with you. If your condition worsens or if you have any concerns, call your doctor or seek emergency medical services as needed. If you have any of the following symptoms/conditions, call your doctor.

## 2022-12-15 NOTE — PROCEDURES
12/15/2022    PROCEDURE PERFORMED:     1. Transesophageal echocardiogram.    INDICATIONS: Possible BAV    PROCEDURE: The patient was brought to the lab in fasting state. The patient received adequate sedation with the assistance of anesthesia for the case. After ensuring adequate sedation, the esophagus was intubated and a complete transesophageal echocardiogram was completed. There were no complications related to the study. CONCLUSION:   Tricuspid aortic valve with trace AR. Bicuspid aortic valve excluded. PLAN:   1. Continue systemic anticoagulation. 2. Continue current cardiac medications. See formal report for full details.      Yahir Bowers MD, [address] [facility]   [phone] [facility]   [phone] [address] central

## 2022-12-15 NOTE — TELEPHONE ENCOUNTER
Pt called and stated that she forgot to get covid tested. Per laura pt is ok to proceed with proceudre as long as pt is not having cold/covid symptoms. Pt stated that she does not.

## 2022-12-16 LAB
EKG ATRIAL RATE: 63 BPM
EKG DIAGNOSIS: NORMAL
EKG P AXIS: 43 DEGREES
EKG P-R INTERVAL: 140 MS
EKG Q-T INTERVAL: 436 MS
EKG QRS DURATION: 104 MS
EKG QTC CALCULATION (BAZETT): 446 MS
EKG R AXIS: 46 DEGREES
EKG T AXIS: 24 DEGREES
EKG VENTRICULAR RATE: 63 BPM

## 2022-12-16 PROCEDURE — 93010 ELECTROCARDIOGRAM REPORT: CPT | Performed by: INTERNAL MEDICINE

## 2022-12-23 ENCOUNTER — HOSPITAL ENCOUNTER (OUTPATIENT)
Dept: CT IMAGING | Age: 47
Discharge: HOME | End: 2022-12-23
Payer: COMMERCIAL

## 2022-12-23 DIAGNOSIS — C56.2 MALIGNANT NEOPLASM OF LEFT OVARY (HCC): ICD-10-CM

## 2022-12-23 PROCEDURE — 74177 CT ABD & PELVIS W/CONTRAST: CPT

## 2022-12-23 PROCEDURE — 6360000004 HC RX CONTRAST MEDICATION: Performed by: NURSE PRACTITIONER

## 2022-12-23 RX ADMIN — IOPAMIDOL 75 ML: 755 INJECTION, SOLUTION INTRAVENOUS at 11:33

## 2022-12-23 RX ADMIN — DIATRIZOATE MEGLUMINE AND DIATRIZOATE SODIUM 15 ML: 660; 100 LIQUID ORAL; RECTAL at 11:34

## 2023-01-03 ENCOUNTER — TELEPHONE (OUTPATIENT)
Dept: SURGERY | Age: 48
End: 2023-01-03

## 2023-01-19 ENCOUNTER — ANESTHESIA EVENT (OUTPATIENT)
Dept: OPERATING ROOM | Age: 48
End: 2023-01-19
Payer: COMMERCIAL

## 2023-01-20 NOTE — PROGRESS NOTES
PAT completed with patient no orders with chart or in Epic will check with MD DOS, patient aware of 0800 arrival time at main entrance of [facility] states her spouse Clint Hunter will be her  and caregiver day of procedure. Suma Storey RN

## 2023-01-20 NOTE — PROGRESS NOTES

## 2023-01-25 ENCOUNTER — HOSPITAL ENCOUNTER (OUTPATIENT)
Age: 48
Setting detail: OUTPATIENT SURGERY
Discharge: HOME | End: 2023-01-25
Attending: SURGERY | Admitting: SURGERY
Payer: COMMERCIAL

## 2023-01-25 ENCOUNTER — ANESTHESIA (OUTPATIENT)
Dept: OPERATING ROOM | Age: 48
End: 2023-01-25
Payer: COMMERCIAL

## 2023-01-25 ENCOUNTER — TELEPHONE (OUTPATIENT)
Dept: SURGERY | Age: 48
End: 2023-01-25

## 2023-01-25 VITALS
HEIGHT: 62 IN | SYSTOLIC BLOOD PRESSURE: 129 MMHG | HEART RATE: 57 BPM | OXYGEN SATURATION: 96 % | RESPIRATION RATE: 14 BRPM | BODY MASS INDEX: 40.48 KG/M2 | DIASTOLIC BLOOD PRESSURE: 69 MMHG | WEIGHT: 220 LBS | TEMPERATURE: 97.3 F

## 2023-01-25 PROBLEM — Z85.43 PERSONAL HISTORY OF OVARIAN CANCER: Status: ACTIVE | Noted: 2023-01-25

## 2023-01-25 PROCEDURE — 2500000003 HC RX 250 WO HCPCS: Performed by: ANESTHESIOLOGY

## 2023-01-25 PROCEDURE — 6360000002 HC RX W HCPCS: Performed by: NURSE ANESTHETIST, CERTIFIED REGISTERED

## 2023-01-25 PROCEDURE — 2500000003 HC RX 250 WO HCPCS: Performed by: SURGERY

## 2023-01-25 PROCEDURE — 6360000002 HC RX W HCPCS: Performed by: SURGERY

## 2023-01-25 PROCEDURE — 7100000010 HC PHASE II RECOVERY - FIRST 15 MIN: Performed by: SURGERY

## 2023-01-25 PROCEDURE — 2580000003 HC RX 258: Performed by: SURGERY

## 2023-01-25 PROCEDURE — 2709999900 HC NON-CHARGEABLE SUPPLY: Performed by: SURGERY

## 2023-01-25 PROCEDURE — 3600000002 HC SURGERY LEVEL 2 BASE: Performed by: SURGERY

## 2023-01-25 PROCEDURE — 3700000001 HC ADD 15 MINUTES (ANESTHESIA): Performed by: SURGERY

## 2023-01-25 PROCEDURE — 3600000012 HC SURGERY LEVEL 2 ADDTL 15MIN: Performed by: SURGERY

## 2023-01-25 PROCEDURE — 7100000011 HC PHASE II RECOVERY - ADDTL 15 MIN: Performed by: SURGERY

## 2023-01-25 PROCEDURE — 3700000000 HC ANESTHESIA ATTENDED CARE: Performed by: SURGERY

## 2023-01-25 PROCEDURE — 36590 REMOVAL TUNNELED CV CATH: CPT | Performed by: SURGERY

## 2023-01-25 PROCEDURE — 2580000003 HC RX 258: Performed by: NURSE ANESTHETIST, CERTIFIED REGISTERED

## 2023-01-25 PROCEDURE — 2580000003 HC RX 258: Performed by: ANESTHESIOLOGY

## 2023-01-25 RX ORDER — SODIUM CHLORIDE 9 MG/ML
INJECTION, SOLUTION INTRAVENOUS PRN
Status: DISCONTINUED | OUTPATIENT
Start: 2023-01-25 | End: 2023-01-25 | Stop reason: HOSPADM

## 2023-01-25 RX ORDER — SODIUM CHLORIDE, SODIUM LACTATE, POTASSIUM CHLORIDE, CALCIUM CHLORIDE 600; 310; 30; 20 MG/100ML; MG/100ML; MG/100ML; MG/100ML
INJECTION, SOLUTION INTRAVENOUS CONTINUOUS PRN
Status: DISCONTINUED | OUTPATIENT
Start: 2023-01-25 | End: 2023-01-25 | Stop reason: SDUPTHER

## 2023-01-25 RX ORDER — FAMOTIDINE 10 MG/ML
20 INJECTION, SOLUTION INTRAVENOUS ONCE
Status: COMPLETED | OUTPATIENT
Start: 2023-01-25 | End: 2023-01-25

## 2023-01-25 RX ORDER — SODIUM CHLORIDE 0.9 % (FLUSH) 0.9 %
5-40 SYRINGE (ML) INJECTION PRN
Status: DISCONTINUED | OUTPATIENT
Start: 2023-01-25 | End: 2023-01-25 | Stop reason: HOSPADM

## 2023-01-25 RX ORDER — PROPOFOL 10 MG/ML
INJECTION, EMULSION INTRAVENOUS PRN
Status: DISCONTINUED | OUTPATIENT
Start: 2023-01-25 | End: 2023-01-25 | Stop reason: SDUPTHER

## 2023-01-25 RX ORDER — SODIUM CHLORIDE 0.9 % (FLUSH) 0.9 %
5-40 SYRINGE (ML) INJECTION EVERY 12 HOURS SCHEDULED
Status: CANCELLED | OUTPATIENT
Start: 2023-01-25

## 2023-01-25 RX ORDER — OXYCODONE HYDROCHLORIDE 5 MG/1
5 TABLET ORAL PRN
Status: CANCELLED | OUTPATIENT
Start: 2023-01-25 | End: 2023-01-25

## 2023-01-25 RX ORDER — SODIUM CHLORIDE 0.9 % (FLUSH) 0.9 %
5-40 SYRINGE (ML) INJECTION PRN
Status: CANCELLED | OUTPATIENT
Start: 2023-01-25

## 2023-01-25 RX ORDER — ONDANSETRON 2 MG/ML
4 INJECTION INTRAMUSCULAR; INTRAVENOUS
Status: CANCELLED | OUTPATIENT
Start: 2023-01-25 | End: 2023-01-26

## 2023-01-25 RX ORDER — SODIUM CHLORIDE, SODIUM LACTATE, POTASSIUM CHLORIDE, CALCIUM CHLORIDE 600; 310; 30; 20 MG/100ML; MG/100ML; MG/100ML; MG/100ML
INJECTION, SOLUTION INTRAVENOUS CONTINUOUS
Status: DISCONTINUED | OUTPATIENT
Start: 2023-01-25 | End: 2023-01-25 | Stop reason: HOSPADM

## 2023-01-25 RX ORDER — FENTANYL CITRATE 50 UG/ML
INJECTION, SOLUTION INTRAMUSCULAR; INTRAVENOUS PRN
Status: DISCONTINUED | OUTPATIENT
Start: 2023-01-25 | End: 2023-01-25 | Stop reason: SDUPTHER

## 2023-01-25 RX ORDER — SODIUM CHLORIDE 9 MG/ML
25 INJECTION, SOLUTION INTRAVENOUS PRN
Status: CANCELLED | OUTPATIENT
Start: 2023-01-25

## 2023-01-25 RX ORDER — SODIUM CHLORIDE 0.9 % (FLUSH) 0.9 %
5-40 SYRINGE (ML) INJECTION EVERY 12 HOURS SCHEDULED
Status: DISCONTINUED | OUTPATIENT
Start: 2023-01-25 | End: 2023-01-25 | Stop reason: HOSPADM

## 2023-01-25 RX ORDER — MEPERIDINE HYDROCHLORIDE 25 MG/ML
12.5 INJECTION INTRAMUSCULAR; INTRAVENOUS; SUBCUTANEOUS EVERY 5 MIN PRN
Status: CANCELLED | OUTPATIENT
Start: 2023-01-25

## 2023-01-25 RX ORDER — OXYCODONE HYDROCHLORIDE 5 MG/1
10 TABLET ORAL PRN
Status: CANCELLED | OUTPATIENT
Start: 2023-01-25 | End: 2023-01-25

## 2023-01-25 RX ADMIN — FENTANYL CITRATE 50 MCG: 50 INJECTION INTRAMUSCULAR; INTRAVENOUS at 09:50

## 2023-01-25 RX ADMIN — SODIUM CHLORIDE, POTASSIUM CHLORIDE, SODIUM LACTATE AND CALCIUM CHLORIDE: 600; 310; 30; 20 INJECTION, SOLUTION INTRAVENOUS at 09:47

## 2023-01-25 RX ADMIN — CEFAZOLIN 2000 MG: 2 INJECTION, POWDER, FOR SOLUTION INTRAMUSCULAR; INTRAVENOUS at 09:30

## 2023-01-25 RX ADMIN — FENTANYL CITRATE 50 MCG: 50 INJECTION INTRAMUSCULAR; INTRAVENOUS at 09:59

## 2023-01-25 RX ADMIN — FAMOTIDINE 20 MG: 10 INJECTION, SOLUTION INTRAVENOUS at 08:30

## 2023-01-25 RX ADMIN — PROPOFOL 100 MG: 10 INJECTION, EMULSION INTRAVENOUS at 09:50

## 2023-01-25 RX ADMIN — SODIUM CHLORIDE, POTASSIUM CHLORIDE, SODIUM LACTATE AND CALCIUM CHLORIDE: 600; 310; 30; 20 INJECTION, SOLUTION INTRAVENOUS at 08:31

## 2023-01-25 ASSESSMENT — PAIN - FUNCTIONAL ASSESSMENT: PAIN_FUNCTIONAL_ASSESSMENT: 0-10

## 2023-01-25 ASSESSMENT — LIFESTYLE VARIABLES: SMOKING_STATUS: 0

## 2023-01-25 ASSESSMENT — ENCOUNTER SYMPTOMS: SHORTNESS OF BREATH: 0

## 2023-01-25 NOTE — PROGRESS NOTES
Patient admitted from OR to PACU. Bedside report received. Patient immediately hooked up to vitals monitor. Janny Clarke RN

## 2023-01-25 NOTE — PROGRESS NOTES
Discharge instructions given to patient and patients . Both deny any questions at this time. Wing Marika RN

## 2023-01-25 NOTE — ANESTHESIA PRE PROCEDURE
Department of Anesthesiology  Preprocedure Note       Name:  Juarez Herrera   Age:  41 y.o.  :  1975                                          MRN:  [id number]         Date:  2023      Surgeon: Urvashi Hamilton):  Sana Cason MD    Procedure: Procedure(s):  PORT REMOVAL    Medications prior to admission:   Prior to Admission medications    Medication Sig Start Date End Date Taking? Authorizing Provider   azelastine (ASTELIN) 0.1 % nasal spray 1 spray by Nasal route 2 times daily Use in each nostril as directed 22   Neo Carlson DO   ELIQUIS 5 MG TABS tablet Take 5 mg by mouth 2 times daily 10/19/21   Historical Provider, MD BLUM THYROID 30 MG tablet TAKE ONE TABLET BY MOUTH DAILY 10/28/21   STEPHANIE Dahl CNP   cetirizine (ZYRTEC) 10 MG tablet Take 10 mg by mouth daily    Historical Provider, MD   buPROPion (WELLBUTRIN XL) 150 MG extended release tablet Take 150 mg by mouth daily 19   Historical Provider, MD   omeprazole (PRILOSEC) 40 MG delayed release capsule Take 40 mg by mouth daily    Historical Provider, MD       Current medications:    Current Facility-Administered Medications   Medication Dose Route Frequency Provider Last Rate Last Admin    famotidine (PEPCID) injection 20 mg  20 mg IntraVENous Once Yoselyn Saavedra MD        lactated ringers IV soln infusion   IntraVENous Continuous Yoselyn Saavedra MD        sodium chloride flush 0.9 % injection 5-40 mL  5-40 mL IntraVENous 2 times per day Yoselyn Saavedra MD        sodium chloride flush 0.9 % injection 5-40 mL  5-40 mL IntraVENous PRN Yoselyn Saavedra MD        0.9 % sodium chloride infusion   IntraVENous PRN Yoselyn Saavedra MD           Allergies:     Allergies   Allergen Reactions    Latex Rash     REDNESS OF HANDS WITH RASH    Sulfa Antibiotics Rash    Other Itching     Kiwi - Itchy throat and mouth  Pinapplie - itchy throat and mouth         Problem List:    Patient Active Problem List   Diagnosis Code    Hypothyroidism E03.9    Fatigue R53.83    Dizziness R42    Class 2 obesity in adult E66.9    Elevated random blood glucose level R73.09    Obstructive sleep apnea syndrome G47.33    Malignant neoplasm of ovary (HCC) C56.9    Abnormal echocardiogram R93.1       Past Medical History:        Diagnosis Date    Acid reflux     Carcinoma in situ of cervix     Endometriosis     Hyperlipidemia     Hypertension     Ovarian cancer ([address]     Ovarian cancer ([facility] [address] 2021    Thyroid disease     hypothyroidism       Past Surgical History:        Procedure Laterality Date    ABDOMEN SURGERY  11    laparoscopic cholecystectomy     SECTION  2007    CHOLECYSTECTOMY      COLONOSCOPY  10/8/12    normal    DENTAL SURGERY  2003    EXTRACTION OF WISDOM TEETH    HYSTERECTOMY (CERVIX STATUS UNKNOWN)  2011    LEEP      OTHER SURGICAL HISTORY  2021    PORT INSERTION    OVARY REMOVAL      PARTIAL HYSTERECTOMY (CERVIX NOT REMOVED)      PORT SURGERY N/A 2021    PORT INSERTION performed by Andrew Tai MD at [address]  2011    UPPER GASTROINTESTINAL ENDOSCOPY      bravo       Social History:    Social History     Tobacco Use    Smoking status: Former     Packs/day: 0.25     Years: 2.00     Pack years: 0.50     Types: Cigarettes     Quit date: 1998     Years since quittin.0    Smokeless tobacco: Never   Substance Use Topics    Alcohol use: Yes     Comment: 2 drinks per year                                Counseling given: Not Answered      Vital Signs (Current):   Vitals:    23 1214   Weight: 220 lb (99.8 kg)   Height: 5' 2\" (1.575 m)                                              BP Readings from Last 3 Encounters:   22 (!) 188/93   22 136/88   22 (!) 143/84       NPO Status: Time of last liquid consumption:                         Time of last solid consumption:  Date of last liquid consumption: 01/25/23                        Date of last solid food consumption: 01/24/23    BMI:   Wt Readings from Last 3 Encounters:   01/20/23 220 lb (99.8 kg)   12/08/22 220 lb (99.8 kg)   11/29/22 222 lb (100.7 kg)     Body mass index is 40.24 kg/m². CBC:   Lab Results   Component Value Date/Time    WBC 8.6 12/15/2022 01:13 PM    RBC 4.65 12/15/2022 01:13 PM    HGB 13.2 12/15/2022 01:13 PM    HCT 39.6 12/15/2022 01:13 PM    MCV 85.2 12/15/2022 01:13 PM    RDW 16.0 12/15/2022 01:13 PM     12/15/2022 01:13 PM       CMP:   Lab Results   Component Value Date/Time     12/15/2022 01:13 PM    K 4.3 12/15/2022 01:13 PM     12/15/2022 01:13 PM    CO2 29 12/15/2022 01:13 PM    BUN 11 12/15/2022 01:13 PM    CREATININE 0.7 12/15/2022 01:13 PM    GFRAA >60 09/21/2022 09:04 AM    GFRAA >60 01/02/2013 06:07 PM    AGRATIO 1.5 12/08/2022 04:32 PM    LABGLOM >60 12/15/2022 01:13 PM    GLUCOSE 83 12/15/2022 01:13 PM    PROT 7.4 12/08/2022 04:32 PM    PROT 7.8 01/02/2013 06:07 PM    CALCIUM 9.8 12/15/2022 01:13 PM    BILITOT <0.2 12/08/2022 04:32 PM    ALKPHOS 143 12/08/2022 04:32 PM    AST 27 12/08/2022 04:32 PM    ALT 41 12/08/2022 04:32 PM       POC Tests: No results for input(s): POCGLU, POCNA, POCK, POCCL, POCBUN, POCHEMO, POCHCT in the last 72 hours.     Coags:   Lab Results   Component Value Date/Time    PROTIME 14.4 12/15/2022 01:13 PM    INR 1.12 12/15/2022 01:13 PM    APTT 33.8 01/02/2013 06:07 PM       HCG (If Applicable):   Lab Results   Component Value Date    PREGTESTUR Negative 02/02/2015        ABGs: No results found for: PHART, PO2ART, ZFN0GAK, HXK3UQK, BEART, A0AWJYYU     Type & Screen (If Applicable):  No results found for: LABABO, LABRH    Drug/Infectious Status (If Applicable):  No results found for: HIV, HEPCAB    COVID-19 Screening (If Applicable):   Lab Results   Component Value Date/Time    COVID19 Not Detected 09/09/2021 04:07 PM Anesthesia Evaluation  Patient summary reviewed no history of anesthetic complications:   Airway: Mallampati: III  TM distance: <3 FB   Neck ROM: full  Mouth opening: > = 3 FB   Dental:          Pulmonary: breath sounds clear to auscultation  (+) sleep apnea (NO CPAP OR DENTAL APPL.):      (-) COPD, asthma, shortness of breath, recent URI and not a current smoker          Patient did not smoke on day of surgery. Cardiovascular:    (+) hypertension (BY HX, NO MEDS CURRENT):, hyperlipidemia    (-) pacemaker, past MI, CAD, CABG/stent, dysrhythmias,  angina and  CHF    ECG reviewed  Rhythm: regular  Rate: normal  Echocardiogram reviewed         Beta Blocker:  Not on Beta Blocker         Neuro/Psych:      (-) seizures, TIA, CVA and psychiatric history           GI/Hepatic/Renal:   (+) GERD: well controlled, liver disease (FATTY):, morbid obesity     (-) hepatitis, no renal disease and bowel prep       Endo/Other:    (+) hypothyroidism, blood dyscrasia (OFF ELIQUIS 3-4D): arthritis:., malignancy/cancer (OVARIAN). (-) diabetes mellitus               Abdominal:   (+) obese,     Abdomen: soft. Vascular:   + DVT (LUE/SUBCLAV), . Other Findings: L CHEST PORT          Anesthesia Plan      general     ASA 3       Induction: intravenous. MIPS: Postoperative opioids intended and Prophylactic antiemetics administered. Anesthetic plan and risks discussed with patient. Plan discussed with CRNA. This pre-anesthesia assessment may be used as a history and physical.    DOS STAFF ADDENDUM:    Pt seen and examined, chart reviewed (including anesthesia, drug and allergy history). No interval changes to history and physical examination. Anesthetic plan, risks, benefits, alternatives, and personnel involved discussed with patient. Patient verbalized an understanding and agrees to proceed.       Eric Gavin MD  January 25, 2023  8:28 AM        Kb Esquivel Adrian Perera MD   1/25/2023

## 2023-01-25 NOTE — DISCHARGE INSTRUCTIONS
Diet and activity as tolerated. Sutures will dissolve. Call [phone], Dr. Trey Jones, with questions. ANESTHESIA DISCHARGE INSTRUCTIONS    You are under the influence of drugs- do not drink alcohol, drive a car, operate machinery(such as power tools, kitchen appliances, etc), sign legal documents, or make any important decisions for 24 hours (or while on pain medications). Children should not ride bikes or [place] or play on gym sets  for 24 hours after surgery. A responsible adult should be with you for 24 hours. Rest at home today- increase activity as tolerated. Progress slowly to a regular diet unless your physician has instructed you otherwise. Drink plenty of water. CALL YOUR DOCTOR IF YOU:  Have moderate to severe nausea or vomiting AND are unable to hold down fluids or prescribed medications. Have bright red bloody drainage from your dressing that won't stop oozing. Do not get relief with your pain medication    NORMAL (POSSIBLE) SIDE EFFECTS FROM ANESTHESIA:     Confusion, temporary memory loss, delayed reaction times in the first 24 hours  Lightheadedness, dizziness, difficulty focusing, blurred vision  Nausea/vomiting can happen  Shivering, feeling cold, sore throat, cough and muscle aches should stop within 24-48 hours  Trouble urinating - call your surgeon if it has been more than 8 hrs  Bruising or soreness at the IV site - call if it remains red, firm or there is drainage             FEMALES OF CHILDBEARING AGE WHO ARE TAKING BIRTH CONTROL PILLS:  You may have received a medication during your procedure that interferes with the   actions of birth control pills (Bridion or Emend). Use some other kind of birth control in addition to your pills, like a condom, for 1 month after your procedure to prevent unwanted pregnancy. The following instructions are to be followed if you have a known history or diagnosis of sleep apnea:   For all sleep apnea patients:  ? Sleep on your side or sitting up in a chair whenever possible, especially the first 24 hours after surgery. ? Use only medicines prescribed by your doctor. ? Do not drink alcohol. ? If you have a dental device to assist you while at rest, use it at all times for the first 24 hours. For patients using CPAP machines:  ? Use your CPAP machine during all periods of sleep as usual.  ? Use your CPAP machine during all periods of daytime rest while on pain medicines. ** Follow up with your primary care doctor for continued care. IF YOU DO NOT TAKE ALL OF YOUR NARCOTIC PAIN MEDICATION, please dispose of them responsibly. There are drop off boxes in the Emergency Departments 24/7 at both [facility] and [facility]. If these locations are not convenient, other options for discarding them can be found at:  [email or web address]    Hospital or office staff may NOT accept any medications to drop off in the cabinet for you.

## 2023-01-25 NOTE — PROGRESS NOTES
Patient discharged via wheelchair in stable condition with all belongings to private car. Quinton Norman RN

## 2023-01-25 NOTE — ANESTHESIA POSTPROCEDURE EVALUATION
Department of Anesthesiology  Postprocedure Note    Patient: Nena Acosta  MRN: [id number]  YOB: 1975  Date of evaluation: 1/25/2023      Procedure Summary     Date: 01/25/23 Room / Location: [place] [address] / [facility]    Anesthesia Start: 6666 Anesthesia Stop: 1010    Procedure: PORT REMOVAL (Chest) Diagnosis:       Primary malignant neoplasm of left ovary (HCC)      (Primary malignant neoplasm of left ovary ([address] [C56.2])    Surgeons: Asher Carcamo MD Responsible Provider: Eric Gavin MD    Anesthesia Type: general ASA Status: 3          Anesthesia Type: No value filed.     Phong Phase I: Phong Score: 10    Phong Phase II: Phong Score: 9    Vitals:    01/20/23 1214 01/25/23 0825 01/25/23 1019   BP:  (!) 171/86    Pulse:  64    Resp:  14    Temp:  98 °F (36.7 °C) 97.3 °F (36.3 °C)   TempSrc:  Temporal Temporal   SpO2:  97%    Weight: 220 lb (99.8 kg) 220 lb (99.8 kg)    Height: 5' 2\" (1.575 m) 5' 2\" (1.575 m)      Anesthesia Post Evaluation    Patient location during evaluation: bedside  Patient participation: complete - patient participated  Level of consciousness: awake and alert  Airway patency: patent  Nausea & Vomiting: no nausea  Complications: no  Cardiovascular status: hemodynamically stable  Respiratory status: acceptable  Hydration status: euvolemic

## 2023-01-25 NOTE — BRIEF OP NOTE
Brief Postoperative Note      Patient: Rafael Ayala  YOB: 1975  MRN: [id number]    Date of Procedure: 1/25/2023    Pre-Op Diagnosis: Primary malignant neoplasm of left ovary (HCC) [C56.2]    Post-Op Diagnosis: Same       Procedure(s):  PORT REMOVAL    Surgeon(s):  Kaylee Kinney MD    Assistant:  Surgical Assistant: Sameer Colon    Anesthesia: Monitor Anesthesia Care    Estimated Blood Loss (mL): Minimal    Complications: None    Specimens:   * No specimens in log *    Implants:  * No implants in log *      Drains: * No LDAs found *    Findings: As above    Electronically signed by Rolando Mullen MD on 1/25/2023 at 10:09 AM

## 2023-01-25 NOTE — H&P
[facility] GENERAL SURGERY      The H&P was reviewed, the patient was examined, and no change has occurred in the patient's condition since the H&P was completed. The indications for the procedure were reviewed, and any questions were answered. I updated the progress note from 12/30/2022 from Dr. Malcom Herman which is the H&P and is located in the media section.      Vitals:    01/25/23 0825   BP: (!) 171/86   Pulse: 64   Resp: 14   Temp: 98 °F (36.7 °C)   SpO2: 97%

## 2023-01-31 NOTE — OP NOTE
[address]                 [address] [address]                                OPERATIVE REPORT    PATIENT NAME: Saeed WISE                    :        1975  MED REC NO:   [id number]                          ROOM:  ACCOUNT NO:   [de-identified]                           ADMIT DATE: 2023  PROVIDER:     Adrián Haddad MD    DATE OF PROCEDURE:  2023    LOCATION:  [facility]. PREOPERATIVE DIAGNOSIS:  Personal history of ovarian cancer. POSTOPERATIVE DIAGNOSIS:  Personal history of ovarian cancer. OPERATION PERFORMED:  Removal of left-sided Port-A-Cath. SURGEON:  Adrián Haddad MD    ANESTHESIA:  Total intravenous anesthesia. COMPLICATIONS:  None. ESTIMATED BLOOD LOSS:  Less than 50 mL. INDICATIONS FOR OPERATION:  A 82-year-old female with a personal history  of ovarian cancer. A request has been made to remove the Port-A-Cath. The risks and benefits were explained. The patient understood them,  accepted them and elected to proceed. DESCRIPTION OF OPERATION:  The patient was brought to the operating  room. Total intravenous anesthesia was initiated. She was prepped and  draped in usual surgical sterile fashion. An incision was made along  the previous insertion site incision. Local anesthetic had been  infiltrated. I dissected down. I identified the catheter. Catheter  was removed in its entirety. The catheter tract was oversewn with a 3-0  Vicryl suture. The port reservoir was removed in its entirety. Hemostasis was obtained. The 3-0 Vicryl was used to reapproximate  subcutaneous tissues. The 4-0 Vicryl was used to reapproximate the  skin. Benzoin and Steri-Strip dressing were placed. DISPOSITION:  The patient tolerated the procedure without any acute  complication.         Chun Navarro MD    D: 2023 19:08:47       T: 2023 19:12:29     [email or web address]  Job#: [id number]     Doc#: [id number]    CC:

## 2023-03-04 ENCOUNTER — OFFICE VISIT (OUTPATIENT)
Dept: URGENT CARE | Age: 48
End: 2023-03-04

## 2023-03-04 VITALS
HEIGHT: 62 IN | RESPIRATION RATE: 14 BRPM | BODY MASS INDEX: 41.22 KG/M2 | SYSTOLIC BLOOD PRESSURE: 134 MMHG | DIASTOLIC BLOOD PRESSURE: 78 MMHG | TEMPERATURE: 98.5 F | WEIGHT: 224 LBS | HEART RATE: 79 BPM | OXYGEN SATURATION: 98 %

## 2023-03-04 DIAGNOSIS — R30.0 DYSURIA: Primary | ICD-10-CM

## 2023-03-04 LAB
BILIRUBIN, POC: NEGATIVE
BLOOD URINE, POC: ABNORMAL
CLARITY, POC: CLEAR
COLOR, POC: YELLOW
GLUCOSE URINE, POC: NEGATIVE
KETONES, POC: NEGATIVE
LEUKOCYTE EST, POC: ABNORMAL
NITRITE, POC: NEGATIVE
PH, POC: 6
PROTEIN, POC: NEGATIVE
SPECIFIC GRAVITY, POC: 1.02
UROBILINOGEN, POC: 0.2

## 2023-03-04 NOTE — PATIENT INSTRUCTIONS
We will call with lab results  Follow up as needed   Go to the ER if you develop abdominal pain, fever or vomiting

## 2023-03-04 NOTE — PROGRESS NOTES
Liana Ramos (:  1975) is a 47 y.o. female,New patient, here for evaluation of the following chief complaint(s):  Urinary Tract Infection and Dysuria      ASSESSMENT/PLAN:  1. Dysuria  Results for POC orders placed in visit on 23   POCT Urinalysis no Micro   Result Value Ref Range    Color, UA YELLOW     Clarity, UA CLEAR     Glucose, UA POC NEGATIVE     Bilirubin, UA NEGATIVE     Ketones, UA NEGATIVE     Spec Grav, UA 1.020     Blood, UA POC SMALL     pH, UA 6.0     Protein, UA POC NEGATIVE     Urobilinogen, UA 0.2     Leukocytes, UA SMALL     Nitrite, UA NEGATIVE    Urine culture  STD testing  Hold antibiotics until results     - POCT Urinalysis no Micro  - Culture, Urine  - VAGINAL PATHOGENS PROBE *A  - C.trachomatis N.gonorrhoeae DNA       Return if symptoms worsen or fail to improve.    SUBJECTIVE/OBJECTIVE:  Patient complains of dysuria, frequency and urgency for 3-4 days. Patient just finished macrobid course for UTI. Was seen and treated at [facility] care. Initially got better then got worse again. Denies any vaginal sxs, is sexually active with her . States she has notified sxs occur and/or return after intercourse.      History provided by:  Patient   used: No    Urinary Tract Infection  Pertinent negatives include no hematuria.   Dysuria   Associated symptoms include frequency. Pertinent negatives include no flank pain or hematuria.     Vitals:    23 1738   BP: 134/78   Site: Left Upper Arm   Position: Sitting   Cuff Size: Medium Adult   Pulse: 79   Resp: 14   Temp: 98.5 °F (36.9 °C)   TempSrc: Oral   SpO2: 98%   Weight: 224 lb (101.6 kg)   Height: 5' 2\" (1.575 m)       Review of Systems   Gastrointestinal:  Negative for abdominal pain.   Genitourinary:  Positive for dysuria and frequency. Negative for dyspareunia, flank pain, hematuria, vaginal bleeding, vaginal discharge and vaginal pain.   All other systems reviewed and are negative.    Physical  Exam  Vitals reviewed. Constitutional:       Appearance: Normal appearance. HENT:      Head: Normocephalic and atraumatic. Eyes:      Pupils: Pupils are equal, round, and reactive to light. Cardiovascular:      Rate and Rhythm: Normal rate and regular rhythm. Pulses: Normal pulses. Heart sounds: Normal heart sounds. No murmur heard. No friction rub. No gallop. Pulmonary:      Effort: Pulmonary effort is normal.   Abdominal:      General: Abdomen is flat. Bowel sounds are normal.      Palpations: Abdomen is soft. Skin:     General: Skin is warm and dry. Neurological:      Mental Status: She is alert and oriented to person, place, and time. An electronic signature was used to authenticate this note.     --STEPHANIE Sellers - CNP

## 2023-03-05 ENCOUNTER — TELEPHONE (OUTPATIENT)
Dept: URGENT CARE | Age: 48
End: 2023-03-05

## 2023-03-05 DIAGNOSIS — N76.0 BACTERIAL VAGINOSIS: Primary | ICD-10-CM

## 2023-03-05 DIAGNOSIS — B96.89 BACTERIAL VAGINOSIS: Primary | ICD-10-CM

## 2023-03-05 DIAGNOSIS — B37.31 VAGINAL YEAST INFECTION: ICD-10-CM

## 2023-03-05 LAB
CANDIDA SPECIES, DNA PROBE: ABNORMAL
GARDNERELLA VAGINALIS, DNA PROBE: ABNORMAL
TRICHOMONAS VAGINALIS DNA: ABNORMAL

## 2023-03-05 RX ORDER — METRONIDAZOLE 500 MG/1
500 TABLET ORAL 2 TIMES DAILY
Qty: 14 TABLET | Refills: 0 | Status: SHIPPED | OUTPATIENT
Start: 2023-03-05 | End: 2023-03-12

## 2023-03-05 RX ORDER — FLUCONAZOLE 150 MG/1
TABLET ORAL
Qty: 2 TABLET | Refills: 0 | Status: SHIPPED | OUTPATIENT
Start: 2023-03-05

## 2023-03-05 ASSESSMENT — ENCOUNTER SYMPTOMS: ABDOMINAL PAIN: 0

## 2023-03-05 NOTE — TELEPHONE ENCOUNTER
Patient called and given positive BV and yeast results. Rx instructions reviewed with patient, verbalized understanding. Will call with remainder of results.

## 2023-03-07 ENCOUNTER — TELEPHONE (OUTPATIENT)
Dept: URGENT CARE | Age: 48
End: 2023-03-07

## 2023-03-24 ENCOUNTER — HOSPITAL ENCOUNTER (OUTPATIENT)
Dept: CT IMAGING | Age: 48
Discharge: HOME | End: 2023-03-24
Payer: COMMERCIAL

## 2023-03-24 DIAGNOSIS — C56.2 MALIGNANT NEOPLASM OF LEFT OVARY (HCC): ICD-10-CM

## 2023-03-24 PROCEDURE — 74177 CT ABD & PELVIS W/CONTRAST: CPT

## 2023-03-24 PROCEDURE — 6360000004 HC RX CONTRAST MEDICATION: Performed by: INTERNAL MEDICINE

## 2023-03-24 RX ADMIN — DIATRIZOATE MEGLUMINE AND DIATRIZOATE SODIUM 15 ML: 660; 100 LIQUID ORAL; RECTAL at 10:46

## 2023-03-24 RX ADMIN — IOPAMIDOL 75 ML: 755 INJECTION, SOLUTION INTRAVENOUS at 10:47

## 2023-06-30 ENCOUNTER — HOSPITAL ENCOUNTER (OUTPATIENT)
Dept: CT IMAGING | Age: 48
Discharge: HOME | End: 2023-06-30
Payer: COMMERCIAL

## 2023-06-30 DIAGNOSIS — C56.2 MALIGNANT NEOPLASM OF LEFT OVARY (HCC): ICD-10-CM

## 2023-06-30 PROCEDURE — 6360000004 HC RX CONTRAST MEDICATION: Performed by: NURSE PRACTITIONER

## 2023-06-30 PROCEDURE — 74177 CT ABD & PELVIS W/CONTRAST: CPT

## 2023-06-30 RX ADMIN — IOPAMIDOL 75 ML: 755 INJECTION, SOLUTION INTRAVENOUS at 10:06

## 2023-06-30 RX ADMIN — DIATRIZOATE MEGLUMINE AND DIATRIZOATE SODIUM 15 ML: 660; 100 LIQUID ORAL; RECTAL at 10:06

## 2023-07-22 ENCOUNTER — OFFICE VISIT (OUTPATIENT)
Dept: URGENT CARE | Age: 48
End: 2023-07-22

## 2023-07-22 VITALS
WEIGHT: 225.3 LBS | HEIGHT: 62 IN | OXYGEN SATURATION: 95 % | BODY MASS INDEX: 41.46 KG/M2 | SYSTOLIC BLOOD PRESSURE: 148 MMHG | DIASTOLIC BLOOD PRESSURE: 84 MMHG | HEART RATE: 77 BPM

## 2023-07-22 DIAGNOSIS — R39.15 URINARY URGENCY: ICD-10-CM

## 2023-07-22 DIAGNOSIS — R03.0 ELEVATED BLOOD PRESSURE READING WITHOUT DIAGNOSIS OF HYPERTENSION: ICD-10-CM

## 2023-07-22 DIAGNOSIS — R35.0 URINARY FREQUENCY: ICD-10-CM

## 2023-07-22 DIAGNOSIS — N39.0 URINARY TRACT INFECTION WITHOUT HEMATURIA, SITE UNSPECIFIED: Primary | ICD-10-CM

## 2023-07-22 DIAGNOSIS — R30.0 DYSURIA: ICD-10-CM

## 2023-07-22 DIAGNOSIS — R82.998 OTHER ABNORMAL FINDINGS IN URINE: ICD-10-CM

## 2023-07-22 PROBLEM — I82.409 DEEP VEIN THROMBOSIS (DVT) (HCC): Status: ACTIVE | Noted: 2023-02-16

## 2023-07-22 LAB
APPEARANCE FLUID: ABNORMAL
BILIRUBIN, POC: ABNORMAL
BLOOD URINE, POC: ABNORMAL
CLARITY, POC: ABNORMAL
COLOR, POC: ABNORMAL
GLUCOSE URINE, POC: ABNORMAL
KETONES, POC: ABNORMAL
LEUKOCYTE EST, POC: ABNORMAL
NITRITE, POC: POSITIVE
PH, POC: 6
PROTEIN, POC: ABNORMAL
SPECIFIC GRAVITY, POC: 1.05
UROBILINOGEN, POC: ABNORMAL

## 2023-07-22 RX ORDER — PHENAZOPYRIDINE HYDROCHLORIDE 200 MG/1
200 TABLET, FILM COATED ORAL 3 TIMES DAILY PRN
Qty: 9 TABLET | Refills: 0 | Status: SHIPPED | OUTPATIENT
Start: 2023-07-22 | End: 2023-07-25

## 2023-07-22 RX ORDER — NITROFURANTOIN 25; 75 MG/1; MG/1
100 CAPSULE ORAL 2 TIMES DAILY
Qty: 10 CAPSULE | Refills: 0 | Status: SHIPPED | OUTPATIENT
Start: 2023-07-22 | End: 2023-07-27

## 2023-07-22 NOTE — PATIENT INSTRUCTIONS
UA showing blood, large leukocytes, elevated protein, and nitrates, and with symptoms and exam findings, concern for UTI  Urine culture sent to confirm, to identify pathogen, and to clarify sensitivities. .  Will augment treatment plan as necessary pending culture results. Macrobid sent for antibiotic treatment - take as directed until completed. Increase water intake during treatment. Can take ibuprofen (Motrin or Advil) or Acetaminophen (Tylenol) for pain symptoms. If fevers, shivering, nausea, vomiting, or severe abdominal or back pain develops, or if symptoms continue to worsen despite treatment plan, follow up with the ER for further evaluation. Discussed pt's elevated BP noted during vital signs - states having scheduled appointment with her PCP to discuss the concern for next week - discussed continued at-home monitoring of BP and follow up with PCP should elevated BP readings persist at home. Follow up in 7 days if symptoms persist or if symptoms worsen.

## 2023-07-22 NOTE — PROGRESS NOTES
Arvin Zafar (:  1975) is a 41 y.o. female,Established patient, here for evaluation of the following chief complaint(s):  Urinary Tract Infection (Reports symptoms since mid week.)        RESULTS:  Results for POC orders placed in visit on 23   POCT Urinalysis no Micro   Result Value Ref Range    Color, UA      Clarity, UA      Glucose, UA POC neg     Bilirubin, UA neg     Ketones, UA neg     Spec Grav, UA 1.05     Blood, UA POC large     pH, UA 6.0     Protein, UA POC 30 mg/dl     Urobilinogen, UA      Leukocytes, UA large     Nitrite, UA positive     Appearance, Fluid Slightly Cloudy Clear, Slightly Cloudy         ASSESSMENT/PLAN:    ICD-10-CM    1. Urinary tract infection without hematuria, site unspecified  N39.0 POCT Urinalysis no Micro      2. Dysuria  R30.0       3. Urinary frequency  R35.0       4. Urinary urgency  R39.15       5. Other abnormal findings in urine  R82.998       6. Elevated blood pressure reading without diagnosis of hypertension  R03.0           UA showing blood, large leukocytes, elevated protein, and nitrates, and with symptoms and exam findings, concern for UTI  Urine culture sent to confirm, to identify pathogen, and to clarify sensitivities. .  Will augment treatment plan as necessary pending culture results. Macrobid sent for antibiotic treatment - take as directed until completed. Increase water intake during treatment. Can take ibuprofen (Motrin or Advil) or Acetaminophen (Tylenol) for pain symptoms. If fevers, shivering, nausea, vomiting, or severe abdominal or back pain develops, or if symptoms continue to worsen despite treatment plan, follow up with the ER for further evaluation. Discussed pt's elevated BP noted during vital signs - states having scheduled appointment with her PCP to discuss the concern for next week - discussed continued at-home monitoring of BP and follow up with PCP should elevated BP readings persist at home.   If headaches,

## 2023-07-23 ASSESSMENT — ENCOUNTER SYMPTOMS
COUGH: 0
BACK PAIN: 0
VOMITING: 0
NAUSEA: 0
DIARRHEA: 0
SHORTNESS OF BREATH: 0
ABDOMINAL PAIN: 0

## 2023-07-25 ENCOUNTER — TELEPHONE (OUTPATIENT)
Dept: URGENT CARE | Age: 48
End: 2023-07-25

## 2023-07-25 LAB
BACTERIA UR CULT: ABNORMAL
ORGANISM: ABNORMAL

## 2023-07-25 NOTE — TELEPHONE ENCOUNTER
Patient returned call regarding urine culture result. Reported positive results and that Annia Melisa is appropriate treatment.   She V/U.

## 2023-07-27 ENCOUNTER — APPOINTMENT (OUTPATIENT)
Dept: GENERAL RADIOLOGY | Age: 48
End: 2023-07-27
Payer: COMMERCIAL

## 2023-07-27 ENCOUNTER — HOSPITAL ENCOUNTER (OUTPATIENT)
Age: 48
Setting detail: OBSERVATION
Discharge: HOME | End: 2023-07-28
Attending: EMERGENCY MEDICINE | Admitting: SURGERY
Payer: COMMERCIAL

## 2023-07-27 DIAGNOSIS — R07.9 CHEST PAIN, UNSPECIFIED TYPE: Primary | ICD-10-CM

## 2023-07-27 LAB
ALBUMIN SERPL-MCNC: 4.9 G/DL (ref 3.4–5)
ALBUMIN/GLOB SERPL: 1.6 {RATIO} (ref 1.1–2.2)
ALP SERPL-CCNC: 141 U/L (ref 40–129)
ALT SERPL-CCNC: 53 U/L (ref 10–40)
ANION GAP SERPL CALCULATED.3IONS-SCNC: 14 MMOL/L (ref 3–16)
AST SERPL-CCNC: 33 U/L (ref 15–37)
BASOPHILS # BLD: 0.1 K/UL (ref 0–0.2)
BASOPHILS NFR BLD: 0.9 %
BILIRUB SERPL-MCNC: 0.3 MG/DL (ref 0–1)
BUN SERPL-MCNC: 13 MG/DL (ref 7–20)
CALCIUM SERPL-MCNC: 9.9 MG/DL (ref 8.3–10.6)
CHLORIDE SERPL-SCNC: 102 MMOL/L (ref 99–110)
CO2 SERPL-SCNC: 26 MMOL/L (ref 21–32)
CREAT SERPL-MCNC: 0.8 MG/DL (ref 0.6–1.1)
D DIMER: <0.27 UG/ML FEU (ref 0–0.6)
DEPRECATED RDW RBC AUTO: 15.6 % (ref 12.4–15.4)
EKG ATRIAL RATE: 66 BPM
EKG DIAGNOSIS: NORMAL
EKG P AXIS: 52 DEGREES
EKG P-R INTERVAL: 134 MS
EKG Q-T INTERVAL: 438 MS
EKG QRS DURATION: 98 MS
EKG QTC CALCULATION (BAZETT): 459 MS
EKG R AXIS: 62 DEGREES
EKG T AXIS: 35 DEGREES
EKG VENTRICULAR RATE: 66 BPM
EOSINOPHIL # BLD: 0.4 K/UL (ref 0–0.6)
EOSINOPHIL NFR BLD: 3.9 %
GFR SERPLBLD CREATININE-BSD FMLA CKD-EPI: >60 ML/MIN/{1.73_M2}
GLUCOSE SERPL-MCNC: 97 MG/DL (ref 70–99)
HCT VFR BLD AUTO: 40.7 % (ref 36–48)
HGB BLD-MCNC: 13.5 G/DL (ref 12–16)
LYMPHOCYTES # BLD: 3.2 K/UL (ref 1–5.1)
LYMPHOCYTES NFR BLD: 33.3 %
MCH RBC QN AUTO: 27.8 PG (ref 26–34)
MCHC RBC AUTO-ENTMCNC: 33.2 G/DL (ref 31–36)
MCV RBC AUTO: 83.7 FL (ref 80–100)
MONOCYTES # BLD: 0.7 K/UL (ref 0–1.3)
MONOCYTES NFR BLD: 7.3 %
NEUTROPHILS # BLD: 5.2 K/UL (ref 1.7–7.7)
NEUTROPHILS NFR BLD: 54.6 %
NT-PROBNP SERPL-MCNC: 80 PG/ML (ref 0–124)
PLATELET # BLD AUTO: 329 K/UL (ref 135–450)
PMV BLD AUTO: 8.8 FL (ref 5–10.5)
POTASSIUM SERPL-SCNC: 3.8 MMOL/L (ref 3.5–5.1)
PROT SERPL-MCNC: 8 G/DL (ref 6.4–8.2)
RBC # BLD AUTO: 4.86 M/UL (ref 4–5.2)
SODIUM SERPL-SCNC: 142 MMOL/L (ref 136–145)
TROPONIN, HIGH SENSITIVITY: 10 NG/L (ref 0–14)
TROPONIN, HIGH SENSITIVITY: 10 NG/L (ref 0–14)
TROPONIN, HIGH SENSITIVITY: 9 NG/L (ref 0–14)
WBC # BLD AUTO: 9.6 K/UL (ref 4–11)

## 2023-07-27 PROCEDURE — 6370000000 HC RX 637 (ALT 250 FOR IP): Performed by: INTERNAL MEDICINE

## 2023-07-27 PROCEDURE — 85379 FIBRIN DEGRADATION QUANT: CPT

## 2023-07-27 PROCEDURE — 85025 COMPLETE CBC W/AUTO DIFF WBC: CPT

## 2023-07-27 PROCEDURE — 2060000000 HC ICU INTERMEDIATE R&B

## 2023-07-27 PROCEDURE — 93010 ELECTROCARDIOGRAM REPORT: CPT | Performed by: INTERNAL MEDICINE

## 2023-07-27 PROCEDURE — 6370000000 HC RX 637 (ALT 250 FOR IP): Performed by: EMERGENCY MEDICINE

## 2023-07-27 PROCEDURE — G0378 HOSPITAL OBSERVATION PER HR: HCPCS

## 2023-07-27 PROCEDURE — 80053 COMPREHEN METABOLIC PANEL: CPT

## 2023-07-27 PROCEDURE — 99285 EMERGENCY DEPT VISIT HI MDM: CPT

## 2023-07-27 PROCEDURE — 84703 CHORIONIC GONADOTROPIN ASSAY: CPT

## 2023-07-27 PROCEDURE — 84484 ASSAY OF TROPONIN QUANT: CPT

## 2023-07-27 PROCEDURE — 36415 COLL VENOUS BLD VENIPUNCTURE: CPT

## 2023-07-27 PROCEDURE — 71046 X-RAY EXAM CHEST 2 VIEWS: CPT

## 2023-07-27 PROCEDURE — 2580000003 HC RX 258: Performed by: INTERNAL MEDICINE

## 2023-07-27 PROCEDURE — 93005 ELECTROCARDIOGRAM TRACING: CPT | Performed by: PHYSICIAN ASSISTANT

## 2023-07-27 PROCEDURE — 83880 ASSAY OF NATRIURETIC PEPTIDE: CPT

## 2023-07-27 RX ORDER — SODIUM CHLORIDE 9 MG/ML
INJECTION, SOLUTION INTRAVENOUS PRN
Status: DISCONTINUED | OUTPATIENT
Start: 2023-07-27 | End: 2023-07-28 | Stop reason: HOSPADM

## 2023-07-27 RX ORDER — BUPROPION HYDROCHLORIDE 150 MG/1
150 TABLET ORAL DAILY
Status: DISCONTINUED | OUTPATIENT
Start: 2023-07-28 | End: 2023-07-28 | Stop reason: HOSPADM

## 2023-07-27 RX ORDER — LOSARTAN POTASSIUM 25 MG/1
25 TABLET ORAL
COMMUNITY

## 2023-07-27 RX ORDER — THYROID 30 MG/1
30 TABLET ORAL DAILY
Status: DISCONTINUED | OUTPATIENT
Start: 2023-07-28 | End: 2023-07-28 | Stop reason: HOSPADM

## 2023-07-27 RX ORDER — POLYETHYLENE GLYCOL 3350 17 G/17G
17 POWDER, FOR SOLUTION ORAL DAILY PRN
Status: DISCONTINUED | OUTPATIENT
Start: 2023-07-27 | End: 2023-07-28 | Stop reason: HOSPADM

## 2023-07-27 RX ORDER — ONDANSETRON 4 MG/1
4 TABLET, ORALLY DISINTEGRATING ORAL EVERY 8 HOURS PRN
Status: DISCONTINUED | OUTPATIENT
Start: 2023-07-27 | End: 2023-07-28 | Stop reason: HOSPADM

## 2023-07-27 RX ORDER — PANTOPRAZOLE SODIUM 40 MG/1
40 TABLET, DELAYED RELEASE ORAL
Status: DISCONTINUED | OUTPATIENT
Start: 2023-07-28 | End: 2023-07-28 | Stop reason: HOSPADM

## 2023-07-27 RX ORDER — ACETAMINOPHEN 650 MG/1
650 SUPPOSITORY RECTAL EVERY 6 HOURS PRN
Status: DISCONTINUED | OUTPATIENT
Start: 2023-07-27 | End: 2023-07-28 | Stop reason: HOSPADM

## 2023-07-27 RX ORDER — ASPIRIN 81 MG/1
324 TABLET, CHEWABLE ORAL ONCE
Status: COMPLETED | OUTPATIENT
Start: 2023-07-27 | End: 2023-07-27

## 2023-07-27 RX ORDER — SODIUM CHLORIDE 0.9 % (FLUSH) 0.9 %
5-40 SYRINGE (ML) INJECTION EVERY 12 HOURS SCHEDULED
Status: DISCONTINUED | OUTPATIENT
Start: 2023-07-27 | End: 2023-07-28 | Stop reason: HOSPADM

## 2023-07-27 RX ORDER — ONDANSETRON 2 MG/ML
4 INJECTION INTRAMUSCULAR; INTRAVENOUS EVERY 6 HOURS PRN
Status: DISCONTINUED | OUTPATIENT
Start: 2023-07-27 | End: 2023-07-28 | Stop reason: HOSPADM

## 2023-07-27 RX ORDER — SODIUM CHLORIDE 0.9 % (FLUSH) 0.9 %
5-40 SYRINGE (ML) INJECTION PRN
Status: DISCONTINUED | OUTPATIENT
Start: 2023-07-27 | End: 2023-07-28 | Stop reason: HOSPADM

## 2023-07-27 RX ORDER — ATORVASTATIN CALCIUM 40 MG/1
40 TABLET, FILM COATED ORAL NIGHTLY
Status: DISCONTINUED | OUTPATIENT
Start: 2023-07-27 | End: 2023-07-28 | Stop reason: HOSPADM

## 2023-07-27 RX ORDER — LOSARTAN POTASSIUM 25 MG/1
25 TABLET ORAL
Status: DISCONTINUED | OUTPATIENT
Start: 2023-07-27 | End: 2023-07-28 | Stop reason: HOSPADM

## 2023-07-27 RX ORDER — ACETAMINOPHEN 325 MG/1
650 TABLET ORAL EVERY 6 HOURS PRN
Status: DISCONTINUED | OUTPATIENT
Start: 2023-07-27 | End: 2023-07-28 | Stop reason: HOSPADM

## 2023-07-27 RX ORDER — ASPIRIN 81 MG/1
81 TABLET, CHEWABLE ORAL DAILY
Status: DISCONTINUED | OUTPATIENT
Start: 2023-07-28 | End: 2023-07-28 | Stop reason: HOSPADM

## 2023-07-27 RX ADMIN — ASPIRIN 324 MG: 81 TABLET, CHEWABLE ORAL at 15:00

## 2023-07-27 RX ADMIN — Medication 10 ML: at 20:52

## 2023-07-27 RX ADMIN — APIXABAN 5 MG: 5 TABLET, FILM COATED ORAL at 20:48

## 2023-07-27 RX ADMIN — LOSARTAN POTASSIUM 25 MG: 25 TABLET, FILM COATED ORAL at 20:48

## 2023-07-27 RX ADMIN — ATORVASTATIN CALCIUM 40 MG: 40 TABLET, FILM COATED ORAL at 20:48

## 2023-07-27 ASSESSMENT — LIFESTYLE VARIABLES
HOW MANY STANDARD DRINKS CONTAINING ALCOHOL DO YOU HAVE ON A TYPICAL DAY: PATIENT DOES NOT DRINK
HOW MANY STANDARD DRINKS CONTAINING ALCOHOL DO YOU HAVE ON A TYPICAL DAY: PATIENT DOES NOT DRINK
HOW OFTEN DO YOU HAVE A DRINK CONTAINING ALCOHOL: NEVER
HOW OFTEN DO YOU HAVE A DRINK CONTAINING ALCOHOL: NEVER

## 2023-07-27 ASSESSMENT — PAIN DESCRIPTION - LOCATION: LOCATION: CHEST

## 2023-07-27 ASSESSMENT — ENCOUNTER SYMPTOMS
GASTROINTESTINAL NEGATIVE: 1
RESPIRATORY NEGATIVE: 1

## 2023-07-27 ASSESSMENT — HEART SCORE
ECG: 0
ECG: 1
ECG: 0

## 2023-07-27 NOTE — PROGRESS NOTES
Consult has been called to Dr. Joel Hernandez on 7/27/2023. Left message on consult line.  7:19 PM    Rob Gonzalez RN  7/27/2023

## 2023-07-27 NOTE — PROGRESS NOTES
Patient admitted to room 302 from ER. Patient oriented to room, call light, bed rails, phone, lights and bathroom. Patient instructed about the schedule of the day including: vital sign frequency, lab draws, possible tests, frequency of MD and staff rounds, daily weights, I &O's and prescribed diet. Telemetry box in place, patient aware of placement and reason. Bed locked, in lowest position, side rails up 2/4, call light within reach. Recliner Assessment  Patient is able to demonstrate the ability to move from a reclining position to an upright position within the recliner. 4 Eyes Skin Assessment     The patient is being assess for   Admission    I agree that 2 RN's have performed a thorough Head to Toe Skin Assessment on the patient. ALL assessment sites listed below have been assessed. Areas assessed for pressure by both nurses:   [x]   Head, Face, and Ears   [x]   Shoulders, Back, and Chest, Abdomen  [x]   Arms, Elbows, and Hands   [x]   Coccyx, Sacrum, and Ischium  [x]   Legs, Feet, and Heels        Skin Assessed Under all Medical Devices by both nurses:  N/A               All Mepilex Borders were peeled back and area peeked at by both nurses:  No: N/A  Please list where Mepilex Borders are located:  N/A             **SHARE this note so that the co-signing nurse is able to place an eSignature**    Co-signer eSignature: Electronically signed by Clary Reese RN on 7/27/23 at 9:01 PM EDT    Does the Patient have Skin Breakdown related to pressure?   No              Bharat Prevention initiated:  No   Wound Care Orders initiated:  No      [facility] nurse consulted for Pressure Injury (Stage 3,4, Unstageable, DTI, NWPT, Complex wounds)and New or Established Ostomies:  No      Primary Nurse eSignature: Electronically signed by Rita Figueroa RN on 7/27/23 at 7:45 PM EDT

## 2023-07-27 NOTE — PLAN OF CARE
40 yo female with PMH of KAVYA, class II obesity, DVT on Eliquis, HTN, presented with chest pain and dizziness.      D-dimer checked and negative   Troponin negative x 2    Admit to PCU for chest pain rule out pending further evaluation  Telemetry  Cards consult  NPO at midnight    [facility], DO

## 2023-07-27 NOTE — PROGRESS NOTES
Report given to SHYANNE ROLLINS [facility]. Pt. Stable. Care transferred at this time.

## 2023-07-28 ENCOUNTER — APPOINTMENT (OUTPATIENT)
Dept: NUCLEAR MEDICINE | Age: 48
End: 2023-07-28
Payer: COMMERCIAL

## 2023-07-28 VITALS
TEMPERATURE: 98.4 F | HEIGHT: 62 IN | HEART RATE: 68 BPM | BODY MASS INDEX: 40.87 KG/M2 | RESPIRATION RATE: 18 BRPM | DIASTOLIC BLOOD PRESSURE: 99 MMHG | OXYGEN SATURATION: 98 % | WEIGHT: 222.1 LBS | SYSTOLIC BLOOD PRESSURE: 161 MMHG

## 2023-07-28 LAB
DEPRECATED RDW RBC AUTO: 15.8 % (ref 12.4–15.4)
HCG SERPL QL: NEGATIVE
HCT VFR BLD AUTO: 38.9 % (ref 36–48)
HGB BLD-MCNC: 13 G/DL (ref 12–16)
LV EF: 65 %
LVEF MODALITY: NORMAL
MCH RBC QN AUTO: 28 PG (ref 26–34)
MCHC RBC AUTO-ENTMCNC: 33.4 G/DL (ref 31–36)
MCV RBC AUTO: 84.1 FL (ref 80–100)
PLATELET # BLD AUTO: 287 K/UL (ref 135–450)
PMV BLD AUTO: 8.4 FL (ref 5–10.5)
RBC # BLD AUTO: 4.62 M/UL (ref 4–5.2)
WBC # BLD AUTO: 7.1 K/UL (ref 4–11)

## 2023-07-28 PROCEDURE — G0378 HOSPITAL OBSERVATION PER HR: HCPCS

## 2023-07-28 PROCEDURE — 2580000003 HC RX 258: Performed by: INTERNAL MEDICINE

## 2023-07-28 PROCEDURE — A9502 TC99M TETROFOSMIN: HCPCS | Performed by: INTERNAL MEDICINE

## 2023-07-28 PROCEDURE — 99215 OFFICE O/P EST HI 40 MIN: CPT | Performed by: INTERNAL MEDICINE

## 2023-07-28 PROCEDURE — 78452 HT MUSCLE IMAGE SPECT MULT: CPT

## 2023-07-28 PROCEDURE — 99238 HOSP IP/OBS DSCHRG MGMT 30/<: CPT | Performed by: INTERNAL MEDICINE

## 2023-07-28 PROCEDURE — 6370000000 HC RX 637 (ALT 250 FOR IP): Performed by: INTERNAL MEDICINE

## 2023-07-28 PROCEDURE — 85027 COMPLETE CBC AUTOMATED: CPT

## 2023-07-28 PROCEDURE — 93017 CV STRESS TEST TRACING ONLY: CPT

## 2023-07-28 PROCEDURE — 3430000000 HC RX DIAGNOSTIC RADIOPHARMACEUTICAL: Performed by: INTERNAL MEDICINE

## 2023-07-28 PROCEDURE — 36415 COLL VENOUS BLD VENIPUNCTURE: CPT

## 2023-07-28 RX ORDER — HYDROCHLOROTHIAZIDE 25 MG/1
25 TABLET ORAL DAILY
Qty: 30 TABLET | Refills: 0 | Status: SHIPPED | OUTPATIENT
Start: 2023-07-29

## 2023-07-28 RX ORDER — HYDROCHLOROTHIAZIDE 25 MG/1
25 TABLET ORAL DAILY
Status: DISCONTINUED | OUTPATIENT
Start: 2023-07-28 | End: 2023-07-28 | Stop reason: HOSPADM

## 2023-07-28 RX ORDER — ATORVASTATIN CALCIUM 40 MG/1
40 TABLET, FILM COATED ORAL NIGHTLY
Qty: 30 TABLET | Refills: 0 | Status: SHIPPED | OUTPATIENT
Start: 2023-07-28

## 2023-07-28 RX ADMIN — BUPROPION HYDROCHLORIDE 150 MG: 150 TABLET, EXTENDED RELEASE ORAL at 08:58

## 2023-07-28 RX ADMIN — TETROFOSMIN 32 MILLICURIE: 1.38 INJECTION, POWDER, LYOPHILIZED, FOR SOLUTION INTRAVENOUS at 13:45

## 2023-07-28 RX ADMIN — Medication 10 ML: at 08:58

## 2023-07-28 RX ADMIN — THYROID, PORCINE 30 MG: 30 TABLET ORAL at 08:58

## 2023-07-28 RX ADMIN — ASPIRIN 81 MG: 81 TABLET, CHEWABLE ORAL at 08:58

## 2023-07-28 RX ADMIN — TETROFOSMIN 10.3 MILLICURIE: 1.38 INJECTION, POWDER, LYOPHILIZED, FOR SOLUTION INTRAVENOUS at 12:02

## 2023-07-28 RX ADMIN — HYDROCHLOROTHIAZIDE 25 MG: 25 TABLET ORAL at 14:34

## 2023-07-28 NOTE — PLAN OF CARE
Problem: Discharge Planning  Goal: Discharge to home or other facility with appropriate resources  Outcome: Progressing  Flowsheets (Taken 7/28/2023 1056)  Discharge to home or other facility with appropriate resources:   Identify barriers to discharge with patient and caregiver   Identify discharge learning needs (meds, wound care, etc)     Problem: Pain  Goal: Verbalizes/displays adequate comfort level or baseline comfort level  Outcome: Progressing  Flowsheets (Taken 7/28/2023 1056)  Verbalizes/displays adequate comfort level or baseline comfort level: Assess pain using appropriate pain scale     Problem: ABCDS Injury Assessment  Goal: Absence of physical injury  Outcome: Progressing

## 2023-07-28 NOTE — H&P
V2.0  History and Physical      Name:  Daksha Garcia /Age/Sex: 1975  (41 y.o. female)   MRN & CSN:  [id number] & [id number] Encounter Date/Time: 2023 11:24 PM EDT   Location:  [facility]- PCP: Brit Ortiz, [address] [place] [address] Day: 1    Assessment and Plan:       Hospital Problems             Last Modified POA    * (Principal) Chest pain 2023 Yes       Assessment/Plan:    Chest pain  - sharp quality  - trop and ECG negative  - no cardiac hx  - if CP recurs obtain trop and ECG  - cardiology consult    Chronic  Obesity -  in lifestyle modification  Hypothyroidism - continue home Synthroid          Diet ADULT DIET; Regular; 4 carb choices (60 gm/meal); Low Fat/Low Chol/High Fiber/RAE; No Caffeine  Diet NPO   DVT Prophylaxis [] Lovenox, []  Heparin, [] SCDs, [] Ambulation,  [] Eliquis, [] Xarelto, [] Coumadin   Code Status Full Code         Personally reviewed Lab Studies and Imaging     Discussed management of the case with ED physician who recommended admission for chest pain    EKG interpreted personally and results indicating NSR    Imaging that was interpreted personally includes CXR and results indicating SOCORRO    Drugs that require monitoring for toxicity include none and the method of monitoring was n/a        History from:     patient    History of Present Illness:     Chief Complaint: chest pain  Daksha Garcia is a 41 y.o. female with pmh of obesity and hypothyroidism who presents with chest pain, sharp in quality, anterior left chest, occurred at rest, no radiation, never happened before, has not recurred since presentation. ECG and trop negative for ishemic changes. Cardiology to see in morning for possible further work up.       Review of Systems:        Pertinent positives and negatives discussed in HPI     Objective:   No intake or output data in the 24 hours ending 23 2324   Vitals:   Vitals:    23 1500 23 1600 23 1700 23 1858   BP: (!) vascularity are within normal limits. There are no focal areas of consolidation or pleural effusion.      No acute abnormality         Electronically signed by Shanice Menendez MD on 7/27/2023 at 11:24 PM

## 2023-07-28 NOTE — PROGRESS NOTES
A stress test was completed on this patient as ordered. The patient tolerated the procedure well, complained of being winded, denied chest pain. Symptom resolved with rest. Awaiting stress imaging at this time.

## 2023-07-28 NOTE — PROGRESS NOTES
CLINICAL PHARMACY NOTE: MEDS TO BEDS    Total # of Prescriptions Filled: 2   The following medications were delivered to the patient:  Discharge Medication List as of 7/28/2023  3:37 PM        START taking these medications    Details   hydroCHLOROthiazide (HYDRODIURIL) 25 MG tablet Take 1 tablet by mouth daily, Disp-30 tablet, R-0Normal      atorvastatin (LIPITOR) 40 MG tablet Take 1 tablet by mouth nightly, Disp-30 tablet, R-0Normal               Additional Documentation: pt signed

## 2023-07-28 NOTE — PROGRESS NOTES
Patient educated on discharge instructions as well as new medications use, dosage, administration and possible side effects. Patient verified knowledge. IV removed without difficulty and dry dressing in place. Telemetry monitor removed and returned to [facility]. Pt left facility in stable condition to Home with all of their personal belongings.

## 2023-07-28 NOTE — PROGRESS NOTES
Pt is lying in bed with their eyes closed. Respirations are easy and even. Call light within reach bed in lowest position with the wheels locked. Will continue to monitor.  Alcon Bautista RN

## 2023-07-28 NOTE — PROGRESS NOTES
07/28/23 1611   Encounter Summary   Encounter Overview/Reason  Initial Encounter   Service Provided For: Patient and family together   Referral/Consult From: [address] Family members   Last Encounter  07/28/23  ( - brief visit following test - patient anticipates going home.   Offered support and prayers.)

## 2023-07-28 NOTE — PLAN OF CARE
Problem: Discharge Planning  Goal: Discharge to home or other facility with appropriate resources  7/28/2023 1636 by Molly Hernandez RN  Outcome: Adequate for Discharge  7/28/2023 1056 by Molly Hernandez RN  Outcome: Progressing  Flowsheets (Taken 7/28/2023 1056)  Discharge to home or other facility with appropriate resources:   Identify barriers to discharge with patient and caregiver   Identify discharge learning needs (meds, wound care, etc)     Problem: Pain  Goal: Verbalizes/displays adequate comfort level or baseline comfort level  7/28/2023 1636 by Molly Hernandez RN  Outcome: Adequate for Discharge  7/28/2023 1056 by Molly Hernandez RN  Outcome: Progressing  Flowsheets (Taken 7/28/2023 1056)  Verbalizes/displays adequate comfort level or baseline comfort level: Assess pain using appropriate pain scale     Problem: ABCDS Injury Assessment  Goal: Absence of physical injury  7/28/2023 1636 by Molly Hernandez RN  Outcome: Adequate for Discharge  7/28/2023 1056 by Molly Hernandez RN  Outcome: Progressing     Problem: Safety - Adult  Goal: Free from fall injury  Outcome: Adequate for Discharge

## 2023-09-15 ENCOUNTER — HOSPITAL ENCOUNTER (OUTPATIENT)
Dept: CT IMAGING | Age: 48
Discharge: HOME | End: 2023-09-15
Attending: INTERNAL MEDICINE
Payer: COMMERCIAL

## 2023-09-15 DIAGNOSIS — C56.2 MALIGNANT NEOPLASM OF LEFT OVARY (HCC): ICD-10-CM

## 2023-09-15 LAB
PERFORMED ON: NORMAL
POC CREATININE: 0.6 MG/DL (ref 0.6–1.1)
POC SAMPLE TYPE: NORMAL

## 2023-09-15 PROCEDURE — 71260 CT THORAX DX C+: CPT

## 2023-09-15 PROCEDURE — 82565 ASSAY OF CREATININE: CPT

## 2023-09-15 PROCEDURE — 6360000004 HC RX CONTRAST MEDICATION: Performed by: INTERNAL MEDICINE

## 2023-09-15 RX ADMIN — IOPAMIDOL 75 ML: 755 INJECTION, SOLUTION INTRAVENOUS at 14:51

## 2023-09-15 RX ADMIN — DIATRIZOATE MEGLUMINE AND DIATRIZOATE SODIUM 15 ML: 660; 100 LIQUID ORAL; RECTAL at 14:53

## 2023-11-22 ENCOUNTER — HOSPITAL ENCOUNTER (OUTPATIENT)
Dept: WOMENS IMAGING | Age: 48
Discharge: HOME | End: 2023-11-22
Payer: COMMERCIAL

## 2023-11-22 DIAGNOSIS — C56.9 MALIGNANT NEOPLASM OF OVARY, UNSPECIFIED LATERALITY (HCC): ICD-10-CM

## 2023-11-22 PROCEDURE — G0279 TOMOSYNTHESIS, MAMMO: HCPCS

## 2024-01-16 ENCOUNTER — HOSPITAL ENCOUNTER (OUTPATIENT)
Dept: VASCULAR LAB | Age: 49
Discharge: HOME | End: 2024-01-16
Payer: COMMERCIAL

## 2024-01-16 DIAGNOSIS — C56.2 MALIGNANT NEOPLASM OF LEFT OVARY (HCC): ICD-10-CM

## 2024-01-16 DIAGNOSIS — Z79.01 LONG TERM (CURRENT) USE OF ANTICOAGULANTS: ICD-10-CM

## 2024-01-16 DIAGNOSIS — I82.602 ACUTE EMBOLISM AND THROMBOSIS OF VEIN OF LEFT UPPER EXTREMITY: ICD-10-CM

## 2024-01-16 PROCEDURE — 93971 EXTREMITY STUDY: CPT

## 2024-02-29 ENCOUNTER — OFFICE VISIT (OUTPATIENT)
Dept: URGENT CARE | Age: 49
End: 2024-02-29

## 2024-02-29 VITALS
SYSTOLIC BLOOD PRESSURE: 121 MMHG | OXYGEN SATURATION: 96 % | WEIGHT: 225 LBS | HEART RATE: 81 BPM | TEMPERATURE: 97.6 F | BODY MASS INDEX: 41.15 KG/M2 | DIASTOLIC BLOOD PRESSURE: 81 MMHG | RESPIRATION RATE: 14 BRPM

## 2024-02-29 DIAGNOSIS — J06.9 UPPER RESPIRATORY TRACT INFECTION, UNSPECIFIED TYPE: Primary | ICD-10-CM

## 2024-02-29 LAB
INFLUENZA VIRUS A RNA: NEGATIVE
INFLUENZA VIRUS B RNA: NEGATIVE
Lab: NORMAL
QC PASS/FAIL: NORMAL
SARS-COV-2 RDRP RESP QL NAA+PROBE: NEGATIVE

## 2024-02-29 RX ORDER — OXYMETAZOLINE HYDROCHLORIDE 0.05 G/100ML
2 SPRAY NASAL 2 TIMES DAILY
Qty: 1 EACH | Refills: 0 | Status: SHIPPED | OUTPATIENT
Start: 2024-02-29 | End: 2024-03-03

## 2024-02-29 RX ORDER — BENZONATATE 100 MG/1
100 CAPSULE ORAL 3 TIMES DAILY PRN
Qty: 30 CAPSULE | Refills: 0 | Status: SHIPPED | OUTPATIENT
Start: 2024-02-29 | End: 2024-03-10

## 2024-02-29 NOTE — PATIENT INSTRUCTIONS
Return for worsening symptoms as discussed  Follow up with your PCP at [facility] as discussed.

## 2024-02-29 NOTE — PROGRESS NOTES
Pt Name: Liana Ramos  MRN: [id number]  Birthdate 1975    Provider: STEPHANIE Thompson CNP  PCP: Jannet Saucedo APRN - CNP    CHIEF COMPLAINT       Chief Complaint   Patient presents with    Congestion     2 days    Pharyngitis    Cough    Otalgia     Right ear is worse since yesterday       HISTORY OF PRESENT ILLNESS:      History From: pt            Chief Complaint: above    Liana Ramos (: 1975) is a 48 y.o. female, Established patient, here for evaluation of the following chief complaint(s):  Congestion (2 days), Pharyngitis, Cough, and Otalgia (Right ear is worse since yesterday)  Sx started Tuesday PM  Mild ear pain, cough, congestion    Mild relief with mucinex    No CP, Sob, WINTER    No covid or flu vaccine this season.     The patient  reports that she quit smoking about 26 years ago. Her smoking use included cigarettes. She started smoking about 28 years ago. She has a 0.5 pack-year smoking history. She has never used smokeless tobacco. She reports current alcohol use. She reports that she does not use drugs.       ASSESSMENT/PLAN:       ICD-10-CM    1. Upper respiratory tract infection, unspecified type  J06.9 (ID Now) POCT COVID-19 Rapid, NAAT     POCT Influenza A/B DNA (Alere i)              2 days Uri sx as above    Covid and flu testing here- neg    The patient is afebrile and nontoxic in appearance. Managing secretions without difficulty. Presentation not consistent with epiglottitis or retropharyngeal abscess. There is no asymmetric tonsillar erythema or uvular deviation and I am not concerned for peritonsillar abscess. There is no pain with manipulation of the trachea and I am not concerned for bacterial tracheitis. No meningismus - not concerning for meningitis.     The plan is supportive care and expectant management.       Follow up with your PCP or return to the clinic in 5 days if symptoms persist or if symptoms worsen.    Reviewed AVS with treatment instructions and

## 2024-03-06 ENCOUNTER — HOSPITAL ENCOUNTER (OUTPATIENT)
Dept: MRI IMAGING | Age: 49
Discharge: HOME | End: 2024-03-06
Attending: SURGERY
Payer: COMMERCIAL

## 2024-03-06 DIAGNOSIS — R92.8 ABNORMAL MAMMOGRAM: ICD-10-CM

## 2024-03-06 PROCEDURE — C8908 MRI W/O FOL W/CONT, BREAST,: HCPCS

## 2024-03-06 PROCEDURE — A9579 GAD-BASE MR CONTRAST NOS,1ML: HCPCS | Performed by: SURGERY

## 2024-03-06 PROCEDURE — 6360000004 HC RX CONTRAST MEDICATION: Performed by: SURGERY

## 2024-03-06 RX ADMIN — GADOTERIDOL 20 ML: 279.3 INJECTION, SOLUTION INTRAVENOUS at 13:54

## 2024-04-11 ENCOUNTER — HOSPITAL ENCOUNTER (OUTPATIENT)
Dept: CT IMAGING | Age: 49
Discharge: HOME | End: 2024-04-11
Attending: INTERNAL MEDICINE
Payer: COMMERCIAL

## 2024-04-11 DIAGNOSIS — C56.2 MALIGNANT NEOPLASM OF LEFT OVARY (HCC): ICD-10-CM

## 2024-04-11 LAB
PERFORMED ON: NORMAL
POC CREATININE: 0.8 MG/DL (ref 0.6–1.1)
POC SAMPLE TYPE: NORMAL

## 2024-04-11 PROCEDURE — 6360000004 HC RX CONTRAST MEDICATION: Performed by: INTERNAL MEDICINE

## 2024-04-11 PROCEDURE — 82565 ASSAY OF CREATININE: CPT

## 2024-04-11 PROCEDURE — 71260 CT THORAX DX C+: CPT

## 2024-04-11 RX ADMIN — DIATRIZOATE MEGLUMINE AND DIATRIZOATE SODIUM 12 ML: 660; 100 LIQUID ORAL; RECTAL at 09:05

## 2024-04-11 RX ADMIN — IOPAMIDOL 75 ML: 755 INJECTION, SOLUTION INTRAVENOUS at 09:05

## 2024-05-08 ENCOUNTER — OFFICE VISIT (OUTPATIENT)
Dept: URGENT CARE | Age: 49
End: 2024-05-08

## 2024-05-08 VITALS
DIASTOLIC BLOOD PRESSURE: 83 MMHG | TEMPERATURE: 98 F | BODY MASS INDEX: 41.41 KG/M2 | OXYGEN SATURATION: 97 % | HEIGHT: 62 IN | HEART RATE: 64 BPM | SYSTOLIC BLOOD PRESSURE: 119 MMHG | WEIGHT: 225 LBS

## 2024-05-08 DIAGNOSIS — J03.90 TONSILLITIS: Primary | ICD-10-CM

## 2024-05-08 RX ORDER — AMOXICILLIN 500 MG/1
500 CAPSULE ORAL 2 TIMES DAILY
Qty: 20 CAPSULE | Refills: 0 | Status: SHIPPED | OUTPATIENT
Start: 2024-05-08 | End: 2024-05-18

## 2024-05-08 ASSESSMENT — ENCOUNTER SYMPTOMS
SORE THROAT: 1
DIARRHEA: 0
COUGH: 0
NAUSEA: 0
VOMITING: 0
SHORTNESS OF BREATH: 0
RHINORRHEA: 1

## 2024-05-08 NOTE — PROGRESS NOTES
Liana Ramos (:  1975) is a 48 y.o. female,Established patient, here for evaluation of the following chief complaint(s):  Pharyngitis (Rt side throat redness/discoloration/Hx of ovarian cancer)      ASSESSMENT/PLAN:    ICD-10-CM    1. Tonsillitis  J03.90 amoxicillin (AMOXIL) 500 MG capsule        Tonsillitis  Take over the counter acetaminophen or ibuprofen for throat pain. Gargle with warm salt water. Take antibiotic as prescribed. Finish all of your antibiotic. Over the counter throat lozenges may help with throat pain. Drink plenty of fluids. Wash your hands often or use an alcohol based hand . Follow up in symptoms worsen or there is no improvement in 7-10 days.     Given verbal and written and discharge instructions. Verbalized understanding of.   SUBJECTIVE/OBJECTIVE:    History provided by:  Patient  Pharyngitis  Severity:  Moderate  Onset quality:  Gradual  Duration:  3 days  Timing:  Constant  Progression:  Unchanged  Chronicity:  New  Associated symptoms: congestion, rhinorrhea and sore throat    Associated symptoms: no cough, no diarrhea, no ear pain, no fatigue, no fever, no loss of consciousness, no myalgias, no nausea, no shortness of breath and no vomiting            Vitals:    24 0812   BP: 119/83   Pulse: 64   Temp: 98 °F (36.7 °C)   TempSrc: Oral   SpO2: 97%   Weight: 102.1 kg (225 lb)   Height: 1.575 m (5' 2\")       Review of Systems   Constitutional:  Negative for fatigue and fever.   HENT:  Positive for congestion, rhinorrhea and sore throat. Negative for ear pain.    Respiratory:  Negative for cough and shortness of breath.    Gastrointestinal:  Negative for diarrhea, nausea and vomiting.   Musculoskeletal:  Negative for myalgias.   Neurological:  Negative for loss of consciousness.       Physical Exam  Constitutional:       Appearance: Normal appearance.   HENT:      Right Ear: Tympanic membrane, ear canal and external ear normal.      Left Ear: Tympanic membrane,

## 2024-07-24 ENCOUNTER — HOSPITAL ENCOUNTER (OUTPATIENT)
Dept: CT IMAGING | Age: 49
Discharge: HOME | End: 2024-07-24
Payer: COMMERCIAL

## 2024-07-24 DIAGNOSIS — C56.2 MALIGNANT NEOPLASM OF LEFT OVARY (HCC): ICD-10-CM

## 2024-07-24 LAB
PERFORMED ON: NORMAL
POC CREATININE: 0.9 MG/DL (ref 0.6–1.1)
POC SAMPLE TYPE: NORMAL

## 2024-07-24 PROCEDURE — 82565 ASSAY OF CREATININE: CPT

## 2024-07-24 PROCEDURE — 6360000004 HC RX CONTRAST MEDICATION: Performed by: PHYSICIAN ASSISTANT

## 2024-07-24 PROCEDURE — 74177 CT ABD & PELVIS W/CONTRAST: CPT

## 2024-07-24 RX ADMIN — IOPAMIDOL 75 ML: 755 INJECTION, SOLUTION INTRAVENOUS at 09:25

## 2024-07-24 RX ADMIN — DIATRIZOATE MEGLUMINE AND DIATRIZOATE SODIUM 12 ML: 660; 100 LIQUID ORAL; RECTAL at 09:25

## 2024-10-24 ENCOUNTER — HOSPITAL ENCOUNTER (OUTPATIENT)
Dept: CT IMAGING | Age: 49
Discharge: HOME | End: 2024-10-24
Payer: COMMERCIAL

## 2024-10-24 DIAGNOSIS — C56.2 MALIGNANT NEOPLASM OF LEFT OVARY (HCC): ICD-10-CM

## 2024-10-24 LAB
PERFORMED ON: NORMAL
POC CREATININE: 0.9 MG/DL (ref 0.6–1.1)
POC SAMPLE TYPE: NORMAL

## 2024-10-24 PROCEDURE — 74177 CT ABD & PELVIS W/CONTRAST: CPT

## 2024-10-24 PROCEDURE — 6360000004 HC RX CONTRAST MEDICATION: Performed by: PHYSICIAN ASSISTANT

## 2024-10-24 PROCEDURE — 82565 ASSAY OF CREATININE: CPT

## 2024-10-24 RX ORDER — IOPAMIDOL 755 MG/ML
75 INJECTION, SOLUTION INTRAVASCULAR
Status: COMPLETED | OUTPATIENT
Start: 2024-10-24 | End: 2024-10-24

## 2024-10-24 RX ORDER — DIATRIZOATE MEGLUMINE AND DIATRIZOATE SODIUM 660; 100 MG/ML; MG/ML
12 SOLUTION ORAL; RECTAL
Status: COMPLETED | OUTPATIENT
Start: 2024-10-24 | End: 2024-10-24

## 2024-10-24 RX ADMIN — IOPAMIDOL 75 ML: 755 INJECTION, SOLUTION INTRAVENOUS at 10:44

## 2024-10-24 RX ADMIN — DIATRIZOATE MEGLUMINE AND DIATRIZOATE SODIUM 12 ML: 660; 100 LIQUID ORAL; RECTAL at 10:44

## 2024-11-27 ENCOUNTER — HOSPITAL ENCOUNTER (OUTPATIENT)
Dept: WOMENS IMAGING | Age: 49
Discharge: HOME | End: 2024-11-27
Payer: COMMERCIAL

## 2024-11-27 ENCOUNTER — TRANSCRIBE ORDERS (OUTPATIENT)
Dept: ADMINISTRATIVE | Age: 49
End: 2024-11-27

## 2024-11-27 VITALS — HEIGHT: 62 IN | WEIGHT: 223 LBS | BODY MASS INDEX: 41.04 KG/M2

## 2024-11-27 DIAGNOSIS — Z12.39 SCREENING BREAST EXAMINATION: ICD-10-CM

## 2024-11-27 DIAGNOSIS — Z15.01 GENETIC SUSCEPTIBILITY TO MALIGNANT NEOPLASM OF BREAST: Primary | ICD-10-CM

## 2024-11-27 DIAGNOSIS — Z12.31 ENCOUNTER FOR SCREENING MAMMOGRAM FOR MALIGNANT NEOPLASM OF BREAST: ICD-10-CM

## 2024-11-27 DIAGNOSIS — C56.2 PRIMARY MALIGNANT NEOPLASM OF LEFT OVARY (HCC): ICD-10-CM

## 2024-11-27 PROCEDURE — 77063 BREAST TOMOSYNTHESIS BI: CPT

## 2024-12-20 ENCOUNTER — OFFICE VISIT (OUTPATIENT)
Dept: URGENT CARE | Age: 49
End: 2024-12-20

## 2024-12-20 VITALS
HEIGHT: 62 IN | DIASTOLIC BLOOD PRESSURE: 75 MMHG | SYSTOLIC BLOOD PRESSURE: 118 MMHG | BODY MASS INDEX: 41.04 KG/M2 | TEMPERATURE: 97 F | HEART RATE: 61 BPM | OXYGEN SATURATION: 97 % | WEIGHT: 223 LBS

## 2024-12-20 DIAGNOSIS — R30.0 DYSURIA: Primary | ICD-10-CM

## 2024-12-20 DIAGNOSIS — N30.00 ACUTE CYSTITIS WITHOUT HEMATURIA: ICD-10-CM

## 2024-12-20 LAB
BILIRUBIN, POC: ABNORMAL
BLOOD URINE, POC: ABNORMAL
CLARITY, POC: ABNORMAL
COLOR, POC: YELLOW
GLUCOSE URINE, POC: ABNORMAL MG/DL
KETONES, POC: ABNORMAL MG/DL
LEUKOCYTE EST, POC: ABNORMAL
NITRITE, POC: ABNORMAL
PH, POC: 6.5
PROTEIN, POC: ABNORMAL MG/DL
SPECIFIC GRAVITY, POC: 1.02
UROBILINOGEN, POC: ABNORMAL

## 2024-12-20 RX ORDER — NITROFURANTOIN 25; 75 MG/1; MG/1
100 CAPSULE ORAL 2 TIMES DAILY
Qty: 14 CAPSULE | Refills: 0 | Status: SHIPPED | OUTPATIENT
Start: 2024-12-20 | End: 2024-12-27

## 2024-12-20 ASSESSMENT — ENCOUNTER SYMPTOMS
SHORTNESS OF BREATH: 0
VOMITING: 0
ABDOMINAL PAIN: 0
EYE PAIN: 0
DIARRHEA: 0
NAUSEA: 0

## 2024-12-20 NOTE — PROGRESS NOTES
Liana Ramos (: 1975) is a 49 y.o. female, Established patient, here for evaluation of the following chief complaint(s):  Urinary Tract Infection (Few days ago. Burning and urgency. )      ASSESSMENT/PLAN:    ICD-10-CM    1. Dysuria  R30.0 POCT Urinalysis no Micro     Culture, Urine      2. Acute cystitis without hematuria  N30.00 nitrofurantoin, macrocrystal-monohydrate, (MACROBID) 100 MG capsule        - Will treat for UTI due to pts symptoms and urinalysis results. Low concern for pyelonephritis, PID, acute abdomen, lethargy or sepsis.   - Pt to drink lots of fluids  - Pt to take medication as prescribed  - Pt ok to take tylenol and ibuprofen as needed  - Pt to call if any symptoms worsen or follow up with PCP if not better in 7 days  - Pt to go to ER if have shortness of breath, chest pain, sudden fever, abdominal pain, flank pain, pelvic pain or lethargy  - Urine culture will be back in 2-3 days      Discussed PCP follow up for persisting or worsening symptoms, or to return to the clinic if unable to obtain PCP follow up for worsening symptoms.    The patient tolerated their visit well. The patient and/or the family were informed of the results of any tests, a time was given to answer questions, a plan was proposed and they agreed with plan. Reviewed AVS with treatment instructions and answered questions - pt/family expresses understanding and agreement with the discussed treatment plan and AVS instructions.      SUBJECTIVE/OBJECTIVE:  HPI:   49 y.o. female presents for complaint of pt states she started 3 days ago with dysuria, urinary frequency and urinary urgency.    Denies fever, body aches, abdominal pain, flank pain, vomiting, diarrhea, hematuria, abnormal vaginal discharge, vaginal pain, pelvic pain, vaginal irritation, vaginal itching or rash.     Has not taken any medication at home. Pt states she has had UTIs in the past and this one feels similar.     Pt provided HPI by themself - pt

## 2024-12-21 LAB — BACTERIA UR CULT: NORMAL

## 2025-01-10 ENCOUNTER — HOSPITAL ENCOUNTER (OUTPATIENT)
Dept: CT IMAGING | Age: 50
Discharge: HOME | End: 2025-01-10
Attending: INTERNAL MEDICINE
Payer: COMMERCIAL

## 2025-01-10 DIAGNOSIS — C56.9 MALIGNANT NEOPLASM OF OVARY, UNSPECIFIED LATERALITY (HCC): ICD-10-CM

## 2025-01-10 LAB
PERFORMED ON: NORMAL
POC CREATININE: 0.9 MG/DL (ref 0.6–1.1)
POC SAMPLE TYPE: NORMAL

## 2025-01-10 PROCEDURE — 71260 CT THORAX DX C+: CPT

## 2025-01-10 PROCEDURE — 6360000004 HC RX CONTRAST MEDICATION: Performed by: INTERNAL MEDICINE

## 2025-01-10 RX ORDER — IOPAMIDOL 755 MG/ML
75 INJECTION, SOLUTION INTRAVASCULAR
Status: COMPLETED | OUTPATIENT
Start: 2025-01-10 | End: 2025-01-10

## 2025-01-10 RX ORDER — DIATRIZOATE MEGLUMINE AND DIATRIZOATE SODIUM 660; 100 MG/ML; MG/ML
12 SOLUTION ORAL; RECTAL
Status: COMPLETED | OUTPATIENT
Start: 2025-01-10 | End: 2025-01-10

## 2025-01-10 RX ADMIN — IOPAMIDOL 75 ML: 755 INJECTION, SOLUTION INTRAVENOUS at 11:15

## 2025-01-10 RX ADMIN — DIATRIZOATE MEGLUMINE AND DIATRIZOATE SODIUM 12 ML: 660; 100 LIQUID ORAL; RECTAL at 11:15

## 2025-04-14 ENCOUNTER — HOSPITAL ENCOUNTER (OUTPATIENT)
Dept: CT IMAGING | Age: 50
Discharge: HOME | End: 2025-04-14
Attending: INTERNAL MEDICINE
Payer: COMMERCIAL

## 2025-04-14 DIAGNOSIS — C56.2 MALIGNANT NEOPLASM OF LEFT OVARY (HCC): ICD-10-CM

## 2025-04-14 LAB
PERFORMED ON: NORMAL
POC CREATININE: 0.8 MG/DL (ref 0.6–1.1)
POC SAMPLE TYPE: NORMAL

## 2025-04-14 PROCEDURE — 6360000004 HC RX CONTRAST MEDICATION: Performed by: INTERNAL MEDICINE

## 2025-04-14 PROCEDURE — 74177 CT ABD & PELVIS W/CONTRAST: CPT

## 2025-04-14 PROCEDURE — 82565 ASSAY OF CREATININE: CPT

## 2025-04-14 RX ORDER — DIATRIZOATE MEGLUMINE AND DIATRIZOATE SODIUM 660; 100 MG/ML; MG/ML
12 SOLUTION ORAL; RECTAL
Status: COMPLETED | OUTPATIENT
Start: 2025-04-14 | End: 2025-04-14

## 2025-04-14 RX ORDER — IOPAMIDOL 755 MG/ML
75 INJECTION, SOLUTION INTRAVASCULAR
Status: COMPLETED | OUTPATIENT
Start: 2025-04-14 | End: 2025-04-14

## 2025-04-14 RX ADMIN — IOPAMIDOL 75 ML: 755 INJECTION, SOLUTION INTRAVENOUS at 09:22

## 2025-04-14 RX ADMIN — DIATRIZOATE MEGLUMINE AND DIATRIZOATE SODIUM 12 ML: 660; 100 LIQUID ORAL; RECTAL at 09:19

## 2025-05-05 ENCOUNTER — HOSPITAL ENCOUNTER (OUTPATIENT)
Dept: MRI IMAGING | Age: 50
Discharge: HOME | End: 2025-05-05
Payer: COMMERCIAL

## 2025-05-05 DIAGNOSIS — Z15.01 GENETIC SUSCEPTIBILITY TO MALIGNANT NEOPLASM OF BREAST: ICD-10-CM

## 2025-05-05 DIAGNOSIS — C56.2 PRIMARY MALIGNANT NEOPLASM OF LEFT OVARY (HCC): ICD-10-CM

## 2025-05-05 PROCEDURE — C8908 MRI W/O FOL W/CONT, BREAST,: HCPCS

## 2025-05-05 PROCEDURE — A9579 GAD-BASE MR CONTRAST NOS,1ML: HCPCS

## 2025-05-05 PROCEDURE — 6360000004 HC RX CONTRAST MEDICATION

## 2025-05-05 RX ADMIN — GADOTERIDOL 20 ML: 279.3 INJECTION, SOLUTION INTRAVENOUS at 12:48

## (undated) DEVICE — 3M™ TEGADERM™ TRANSPARENT FILM DRESSING FRAME STYLE, 1626W, 4 IN X 4-3/4 IN (10 CM X 12 CM), 50/CT 4CT/CASE: Brand: 3M™ TEGADERM™

## (undated) DEVICE — SUTURE VCRL SZ 3-0 L18IN ABSRB UD L26MM SH 1/2 CIR J864D

## (undated) DEVICE — SYRINGE MED 10ML LUERLOCK TIP W/O SFTY DISP

## (undated) DEVICE — APPLICATOR MEDICATED 26 CC SOLUTION HI LT ORNG CHLORAPREP

## (undated) DEVICE — CANNULA NSL 13FT TUBE AD ETCO2 DIV SAMP M

## (undated) DEVICE — Z INACTIVE USE 2855096 SPONGE GZ W4XL4IN 8 PLY 100% COT

## (undated) DEVICE — DRAPE C ARM UNIV W41XL74IN CLR PLAS XR VELC CLSR POLY STRP

## (undated) DEVICE — GLOVE ORANGE PI 8 1/2   MSG9085

## (undated) DEVICE — CANNULA NSL O2 AD 7 FT TBNG SFT TCH STD CONN N FLARED PRNG

## (undated) DEVICE — GOWN,AURORA,NONREINF,RAGLAN,XXL,STERILE: Brand: MEDLINE

## (undated) DEVICE — PACK,UNIVERSAL,SPLIT,II,AURORA: Brand: MEDLINE

## (undated) DEVICE — MAJOR SET UP PK

## (undated) DEVICE — ELECTRODE PT RET AD L9FT HI MOIST COND ADH HYDRGEL CORDED

## (undated) DEVICE — SUTURE VCRL SZ 4-0 L18IN ABSRB UD L19MM PS-2 3/8 CIR PRIM J496H

## (undated) DEVICE — INTENDED FOR TISSUE SEPARATION, AND OTHER PROCEDURES THAT REQUIRE A SHARP SURGICAL BLADE TO PUNCTURE OR CUT.: Brand: BARD-PARKER ® STAINLESS STEEL BLADES

## (undated) DEVICE — 3M™ STERI-STRIP™ COMPOUND BENZOIN TINCTURE 40 BAGS/CARTON 4 CARTONS/CASE C1544: Brand: 3M™ STERI-STRIP™

## (undated) DEVICE — SUTURE VCRL + SZ 3-0 L27IN ABSRB UD L26MM SH 1/2 CIR VCP416H

## (undated) DEVICE — APPLICATOR PREP 26ML 0.7% IOD POVACRYLEX 74% ISO ALC ST

## (undated) DEVICE — NEEDLE HYPO 25GA L1.5IN BLU POLYPR HUB S STL REG BVL STR

## (undated) DEVICE — GAUZE,SPONGE,4"X4",8PLY,STRL,LF,10/TRAY: Brand: MEDLINE

## (undated) DEVICE — 3M™ STERI-STRIP™ REINFORCED ADHESIVE SKIN CLOSURES, R1540, 1/8 IN X 3 IN (3 MM X 75 MM), 5 STRIPS/ENVELOPE: Brand: 3M™ STERI-STRIP™

## (undated) DEVICE — SYRINGE 30CC LUER LOCK: Brand: CARDINAL HEALTH

## (undated) DEVICE — GOWN SIRUS NONREIN XL W/TWL: Brand: MEDLINE INDUSTRIES, INC.